# Patient Record
Sex: FEMALE | Race: WHITE | Employment: PART TIME | URBAN - METROPOLITAN AREA
[De-identification: names, ages, dates, MRNs, and addresses within clinical notes are randomized per-mention and may not be internally consistent; named-entity substitution may affect disease eponyms.]

---

## 2017-02-06 DIAGNOSIS — N92.0 MENORRHAGIA WITH REGULAR CYCLE: ICD-10-CM

## 2017-04-11 ENCOUNTER — EMPLOYEE WELLNESS (OUTPATIENT)
Dept: OTHER | Age: 42
End: 2017-04-11

## 2017-04-11 LAB
CHOLESTEROL/HDL RATIO: 2.4
CHOLESTEROL: 138 MG/DL
GLUCOSE BLD-MCNC: 96 MG/DL (ref 70–99)
HDLC SERPL-MCNC: 58 MG/DL
LDL CHOLESTEROL: 70 MG/DL (ref 0–130)
PATIENT FASTING?: YES
TRIGL SERPL-MCNC: 51 MG/DL
VLDLC SERPL CALC-MCNC: NORMAL MG/DL (ref 1–30)

## 2017-04-24 ENCOUNTER — OFFICE VISIT (OUTPATIENT)
Dept: OBGYN CLINIC | Age: 42
End: 2017-04-24
Payer: COMMERCIAL

## 2017-04-24 VITALS
DIASTOLIC BLOOD PRESSURE: 60 MMHG | WEIGHT: 143 LBS | BODY MASS INDEX: 22.98 KG/M2 | HEIGHT: 66 IN | SYSTOLIC BLOOD PRESSURE: 102 MMHG

## 2017-04-24 DIAGNOSIS — Z12.31 VISIT FOR SCREENING MAMMOGRAM: ICD-10-CM

## 2017-04-24 DIAGNOSIS — Z01.419 PAP SMEAR, AS PART OF ROUTINE GYNECOLOGICAL EXAMINATION: Primary | ICD-10-CM

## 2017-04-24 PROCEDURE — 99396 PREV VISIT EST AGE 40-64: CPT | Performed by: NURSE PRACTITIONER

## 2017-04-24 ASSESSMENT — ENCOUNTER SYMPTOMS
COLOR CHANGE: 0
NAUSEA: 0
BLOOD IN STOOL: 0
PHOTOPHOBIA: 0
VOMITING: 0
BACK PAIN: 0
COUGH: 0
CONSTIPATION: 0
CHEST TIGHTNESS: 0
SHORTNESS OF BREATH: 0
ABDOMINAL PAIN: 0
WHEEZING: 0
ABDOMINAL DISTENTION: 0
DIARRHEA: 0

## 2017-05-11 ENCOUNTER — TELEPHONE (OUTPATIENT)
Dept: OBGYN CLINIC | Age: 42
End: 2017-05-11

## 2017-05-23 ENCOUNTER — TELEPHONE (OUTPATIENT)
Dept: OBGYN CLINIC | Age: 42
End: 2017-05-23

## 2017-07-12 ENCOUNTER — HOSPITAL ENCOUNTER (OUTPATIENT)
Dept: WOMENS IMAGING | Age: 42
Discharge: HOME | End: 2017-07-12
Payer: COMMERCIAL

## 2017-07-12 DIAGNOSIS — Z12.31 VISIT FOR SCREENING MAMMOGRAM: ICD-10-CM

## 2017-07-12 PROCEDURE — 77063 BREAST TOMOSYNTHESIS BI: CPT

## 2017-09-11 DIAGNOSIS — N92.0 MENORRHAGIA WITH REGULAR CYCLE: ICD-10-CM

## 2017-10-18 NOTE — PROGRESS NOTES
Subjective:      Patient ID: Alma Delia De Paz is a 40 y.o. female. HPI Presents for 6 month follow up of LGSIL  Is on daily aygestin, would like to stop lets just do the first 7 days every month . Allergy : latex, Ancef, Shell fish, Cephalosporin  Med Hx:   Surgery : Colonoscopy, D&C, Laparoscopy, [place] tooth,     Review of Systems   Constitutional: Negative for chills, fatigue and fever. HENT: Negative for sore throat, tinnitus and trouble swallowing. Eyes: Negative for photophobia and visual disturbance. Respiratory: Negative for cough, shortness of breath, wheezing and stridor. Cardiovascular: Negative for chest pain and leg swelling. Gastrointestinal: Negative for abdominal distention, abdominal pain, constipation, diarrhea and nausea. Genitourinary: Negative for difficulty urinating, dyspareunia, dysuria, genital sores, hematuria, menstrual problem, pelvic pain, vaginal bleeding and vaginal discharge. Musculoskeletal: Negative for arthralgias, back pain, gait problem and joint swelling. Neurological: Negative for dizziness, tremors, seizures, syncope, facial asymmetry, speech difficulty, light-headedness and headaches. Psychiatric/Behavioral: Negative for agitation, behavioral problems, confusion, decreased concentration, dysphoric mood and hallucinations. The patient is not nervous/anxious. Objective:   Physical Exam   Constitutional: She is oriented to person, place, and time. She appears well-developed and well-nourished. Abdominal: Soft. Bowel sounds are normal. She exhibits no distension. There is no tenderness. There is no rebound. Genitourinary: Vagina normal and uterus normal. No vaginal discharge found. Musculoskeletal: Normal range of motion. She exhibits no edema or tenderness. Neurological: She is alert and oriented to person, place, and time. Skin: Skin is warm and dry. No rash noted. No erythema. Psychiatric: She has a normal mood and affect.  Her behavior is normal. Thought content normal.       Assessment:    LGSIL       Plan:    Collect pap   Treatment pending results   Try aygestin 5 mg 1st 7 days of the month   Period diary   rv 6 months which is her annual time

## 2017-10-23 ENCOUNTER — HOSPITAL ENCOUNTER (OUTPATIENT)
Age: 42
Setting detail: SPECIMEN
Discharge: HOME | End: 2017-10-23
Payer: COMMERCIAL

## 2017-10-23 ENCOUNTER — OFFICE VISIT (OUTPATIENT)
Dept: OBGYN CLINIC | Age: 42
End: 2017-10-23
Payer: COMMERCIAL

## 2017-10-23 VITALS — WEIGHT: 143 LBS | BODY MASS INDEX: 23.08 KG/M2 | DIASTOLIC BLOOD PRESSURE: 62 MMHG | SYSTOLIC BLOOD PRESSURE: 116 MMHG

## 2017-10-23 DIAGNOSIS — N92.0 MENORRHAGIA WITH REGULAR CYCLE: ICD-10-CM

## 2017-10-23 DIAGNOSIS — R87.612 LGSIL ON PAP SMEAR OF CERVIX: Primary | ICD-10-CM

## 2017-10-23 PROCEDURE — 99213 OFFICE O/P EST LOW 20 MIN: CPT | Performed by: NURSE PRACTITIONER

## 2017-10-23 ASSESSMENT — ENCOUNTER SYMPTOMS
SORE THROAT: 0
STRIDOR: 0
PHOTOPHOBIA: 0
ABDOMINAL PAIN: 0
COUGH: 0
WHEEZING: 0
DIARRHEA: 0
BACK PAIN: 0
ABDOMINAL DISTENTION: 0
TROUBLE SWALLOWING: 0
SHORTNESS OF BREATH: 0
NAUSEA: 0
CONSTIPATION: 0

## 2017-10-25 LAB
HPV SAMPLE: NORMAL
HPV SOURCE: NORMAL
HPV, GENOTYPE 16: NOT DETECTED
HPV, GENOTYPE 18: NOT DETECTED
HPV, HIGH RISK OTHER: NOT DETECTED
HPV, INTERPRETATION: NORMAL

## 2017-10-31 LAB — CYTOLOGY REPORT: NORMAL

## 2017-11-27 ENCOUNTER — TELEPHONE (OUTPATIENT)
Dept: OBGYN CLINIC | Age: 42
End: 2017-11-27

## 2017-11-27 NOTE — TELEPHONE ENCOUNTER
Patient states she has been using Aygestin for about 3 yrs to control bleeding. Did try to go off around Halloween and menorrhagia returned soaking through maxi pad in 1-1 1/2 hr all over car seat, so has decided to have tubal and endometrial ablation if can do at same time. Went back on Aygestin continually again and knows she will need to do that until surgery. Has [employer] but [facility] 2ndary and [place] [facility] [place] Is checking with [facility] to make sure as secondary they will cover. Appt for consult or ok to go ahead and schedule.

## 2017-11-28 ENCOUNTER — HOSPITAL ENCOUNTER (OUTPATIENT)
Age: 42
Discharge: HOME | End: 2017-11-28
Payer: COMMERCIAL

## 2017-11-28 LAB
T3 FREE: 3.35 PG/ML (ref 2.02–4.43)
THYROXINE, FREE: 1.08 NG/DL (ref 0.93–1.7)
TSH SERPL DL<=0.05 MIU/L-ACNC: 2.2 MIU/L (ref 0.3–5)

## 2017-11-28 PROCEDURE — 36415 COLL VENOUS BLD VENIPUNCTURE: CPT

## 2017-11-28 PROCEDURE — 84481 FREE ASSAY (FT-3): CPT

## 2017-11-28 PROCEDURE — 84443 ASSAY THYROID STIM HORMONE: CPT

## 2017-11-28 PROCEDURE — 84439 ASSAY OF FREE THYROXINE: CPT

## 2018-01-11 DIAGNOSIS — N92.0 MENORRHAGIA WITH REGULAR CYCLE: ICD-10-CM

## 2018-03-20 VITALS — WEIGHT: 139 LBS | BODY MASS INDEX: 23.13 KG/M2

## 2018-04-20 ENCOUNTER — TELEPHONE (OUTPATIENT)
Dept: OBGYN CLINIC | Age: 43
End: 2018-04-20

## 2018-04-23 ENCOUNTER — OFFICE VISIT (OUTPATIENT)
Dept: OBGYN CLINIC | Age: 43
End: 2018-04-23
Payer: COMMERCIAL

## 2018-04-23 ENCOUNTER — HOSPITAL ENCOUNTER (OUTPATIENT)
Age: 43
Setting detail: SPECIMEN
Discharge: HOME | End: 2018-04-23
Payer: COMMERCIAL

## 2018-04-23 VITALS
SYSTOLIC BLOOD PRESSURE: 112 MMHG | WEIGHT: 143 LBS | BODY MASS INDEX: 23.82 KG/M2 | HEIGHT: 65 IN | DIASTOLIC BLOOD PRESSURE: 64 MMHG

## 2018-04-23 DIAGNOSIS — Z01.419 PAPANICOLAOU SMEAR, AS PART OF ROUTINE GYNECOLOGICAL EXAMINATION: Primary | ICD-10-CM

## 2018-04-23 DIAGNOSIS — Z12.31 VISIT FOR SCREENING MAMMOGRAM: ICD-10-CM

## 2018-04-23 DIAGNOSIS — R87.612 LOW GRADE SQUAMOUS INTRAEPITHELIAL LESION (LGSIL) ON PAPANICOLAOU SMEAR OF CERVIX: ICD-10-CM

## 2018-04-23 DIAGNOSIS — N92.0 MENORRHAGIA WITH REGULAR CYCLE: ICD-10-CM

## 2018-04-23 PROCEDURE — 99396 PREV VISIT EST AGE 40-64: CPT | Performed by: NURSE PRACTITIONER

## 2018-04-23 ASSESSMENT — ENCOUNTER SYMPTOMS
NAUSEA: 0
COLOR CHANGE: 0
ABDOMINAL DISTENTION: 0
DIARRHEA: 0
COUGH: 0
SHORTNESS OF BREATH: 0
WHEEZING: 0
BACK PAIN: 0
CONSTIPATION: 0
VOMITING: 0
STRIDOR: 0
VOICE CHANGE: 0
PHOTOPHOBIA: 0
TROUBLE SWALLOWING: 0
ABDOMINAL PAIN: 0
SORE THROAT: 0

## 2018-05-11 LAB — CYTOLOGY REPORT: NORMAL

## 2018-05-15 ENCOUNTER — TELEPHONE (OUTPATIENT)
Dept: OBGYN CLINIC | Age: 43
End: 2018-05-15

## 2018-05-16 ENCOUNTER — OFFICE VISIT (OUTPATIENT)
Dept: OBGYN CLINIC | Age: 43
End: 2018-05-16

## 2018-05-16 VITALS — DIASTOLIC BLOOD PRESSURE: 62 MMHG | SYSTOLIC BLOOD PRESSURE: 120 MMHG

## 2018-05-16 DIAGNOSIS — Z98.51 S/P TUBAL LIGATION: ICD-10-CM

## 2018-05-16 DIAGNOSIS — Z98.890 S/P ENDOMETRIAL ABLATION: Primary | ICD-10-CM

## 2018-05-16 PROCEDURE — 99024 POSTOP FOLLOW-UP VISIT: CPT | Performed by: OBSTETRICS & GYNECOLOGY

## 2018-05-18 ENCOUNTER — EMPLOYEE WELLNESS (OUTPATIENT)
Dept: OTHER | Age: 43
End: 2018-05-18

## 2018-05-18 LAB
CHOLESTEROL/HDL RATIO: 2.2
CHOLESTEROL: 164 MG/DL
GLUCOSE BLD-MCNC: 103 MG/DL (ref 70–99)
HDLC SERPL-MCNC: 75 MG/DL
LDL CHOLESTEROL: 79 MG/DL (ref 0–130)
PATIENT FASTING?: YES
TRIGL SERPL-MCNC: 48 MG/DL
VLDLC SERPL CALC-MCNC: ABNORMAL MG/DL (ref 1–30)

## 2018-05-29 VITALS — WEIGHT: 143 LBS | BODY MASS INDEX: 23.8 KG/M2

## 2018-07-24 ENCOUNTER — HOSPITAL ENCOUNTER (OUTPATIENT)
Dept: WOMENS IMAGING | Age: 43
Discharge: HOME | End: 2018-07-26
Payer: COMMERCIAL

## 2018-07-24 DIAGNOSIS — Z12.31 VISIT FOR SCREENING MAMMOGRAM: ICD-10-CM

## 2018-07-24 PROCEDURE — 77067 SCR MAMMO BI INCL CAD: CPT

## 2018-10-31 ENCOUNTER — OFFICE VISIT (OUTPATIENT)
Dept: FAMILY MEDICINE CLINIC | Age: 43
End: 2018-10-31
Payer: COMMERCIAL

## 2018-10-31 ENCOUNTER — HOSPITAL ENCOUNTER (OUTPATIENT)
Age: 43
Setting detail: SPECIMEN
Discharge: HOME | End: 2018-10-31
Payer: COMMERCIAL

## 2018-10-31 VITALS
DIASTOLIC BLOOD PRESSURE: 62 MMHG | OXYGEN SATURATION: 97 % | HEIGHT: 66 IN | TEMPERATURE: 98.2 F | WEIGHT: 147 LBS | HEART RATE: 71 BPM | BODY MASS INDEX: 23.63 KG/M2 | SYSTOLIC BLOOD PRESSURE: 110 MMHG

## 2018-10-31 DIAGNOSIS — Z11.59 ENCOUNTER FOR HEPATITIS C SCREENING TEST FOR LOW RISK PATIENT: ICD-10-CM

## 2018-10-31 DIAGNOSIS — Z76.89 ENCOUNTER TO ESTABLISH CARE: Primary | ICD-10-CM

## 2018-10-31 DIAGNOSIS — Z13.29 SCREENING FOR THYROID DISORDER: ICD-10-CM

## 2018-10-31 PROBLEM — J30.1 ALLERGIC RHINITIS DUE TO POLLEN: Status: ACTIVE | Noted: 2018-10-31

## 2018-10-31 PROBLEM — G44.209 TENSION-TYPE HEADACHE: Status: ACTIVE | Noted: 2018-10-31

## 2018-10-31 LAB
HEPATITIS C ANTIBODY: NONREACTIVE
TSH SERPL DL<=0.05 MIU/L-ACNC: 2.11 MIU/L (ref 0.3–5)

## 2018-10-31 PROCEDURE — 99203 OFFICE O/P NEW LOW 30 MIN: CPT | Performed by: NURSE PRACTITIONER

## 2018-10-31 ASSESSMENT — PATIENT HEALTH QUESTIONNAIRE - PHQ9
2. FEELING DOWN, DEPRESSED OR HOPELESS: 0
1. LITTLE INTEREST OR PLEASURE IN DOING THINGS: 0
SUM OF ALL RESPONSES TO PHQ9 QUESTIONS 1 & 2: 0
SUM OF ALL RESPONSES TO PHQ QUESTIONS 1-9: 0
SUM OF ALL RESPONSES TO PHQ QUESTIONS 1-9: 0

## 2018-10-31 ASSESSMENT — ENCOUNTER SYMPTOMS
RHINORRHEA: 0
CONSTIPATION: 0
CHEST TIGHTNESS: 0
SHORTNESS OF BREATH: 0
DIARRHEA: 0
COUGH: 0
ABDOMINAL PAIN: 0
EYE DISCHARGE: 0
SINUS PRESSURE: 0
BLOOD IN STOOL: 0

## 2019-05-23 ENCOUNTER — EMPLOYEE WELLNESS (OUTPATIENT)
Dept: OTHER | Age: 44
End: 2019-05-23

## 2019-05-23 LAB
CHOLESTEROL/HDL RATIO: 2
CHOLESTEROL: 149 MG/DL
GLUCOSE BLD-MCNC: 94 MG/DL (ref 70–99)
HDLC SERPL-MCNC: 74 MG/DL
LDL CHOLESTEROL: 69 MG/DL (ref 0–130)
PATIENT FASTING?: YES
TRIGL SERPL-MCNC: 32 MG/DL
VLDLC SERPL CALC-MCNC: NORMAL MG/DL (ref 1–30)

## 2019-06-03 VITALS — BODY MASS INDEX: 23.26 KG/M2 | WEIGHT: 143 LBS

## 2019-06-11 ENCOUNTER — OFFICE VISIT (OUTPATIENT)
Dept: OBGYN CLINIC | Age: 44
End: 2019-06-11
Payer: COMMERCIAL

## 2019-06-11 VITALS — SYSTOLIC BLOOD PRESSURE: 110 MMHG | BODY MASS INDEX: 23.26 KG/M2 | WEIGHT: 143 LBS | DIASTOLIC BLOOD PRESSURE: 62 MMHG

## 2019-06-11 DIAGNOSIS — Z80.3 FAMILY HISTORY OF BREAST CANCER IN MOTHER: ICD-10-CM

## 2019-06-11 DIAGNOSIS — N63.0 BREAST MASS IN FEMALE: Primary | ICD-10-CM

## 2019-06-11 DIAGNOSIS — Z80.3 FAMILY HISTORY OF BREAST CANCER IN SISTER: ICD-10-CM

## 2019-06-11 PROCEDURE — 99213 OFFICE O/P EST LOW 20 MIN: CPT | Performed by: NURSE PRACTITIONER

## 2019-06-11 RX ORDER — DOXYCYCLINE HYCLATE 100 MG
100 TABLET ORAL 2 TIMES DAILY
Qty: 20 TABLET | Refills: 0 | Status: SHIPPED | OUTPATIENT
Start: 2019-06-11 | End: 2019-06-21

## 2019-06-11 ASSESSMENT — ENCOUNTER SYMPTOMS
WHEEZING: 0
COLOR CHANGE: 0
VOMITING: 0
SHORTNESS OF BREATH: 0
ABDOMINAL PAIN: 0
NAUSEA: 0
SWOLLEN GLANDS: 0

## 2019-06-11 NOTE — PROGRESS NOTES
[facility] [place]  [place] [address] [place] [address] [place] [id number]  Dept: [phone]  Dept Fax: [phone]     Floyd Villalpando is a 38 y.o. female who presents today for her medical conditions/complaintsas noted below. Floyd Villalpando is c/o of Breast Problem (rigth side- lump about the size of a marble-red started friday. )        HPI:     Other   This is a new (right breast mass) problem. The current episode started in the past 7 days (5 days). The problem occurs constantly. The problem has been gradually improving. Pertinent negatives include no abdominal pain, anorexia, chest pain, chills, fatigue, fever, nausea, neck pain, swollen glands, vomiting or weakness. Nothing aggravates the symptoms. Treatments tried: did try to express area it to see if would drain but didn't. The treatment provided no relief. Cholo Moser presents to office complaint of left breast mass states noted on 6/7/19 was around marble size at that point non tender no erythem or streaking, tried to express fluid from but never drained then has little scab following day somewhat worse, no has improved in size states smaller. Denies f/c, n/v, any other masses or swelling, or galactorrhea. Mother has hx of breast ca X2 postmenopausal diagnosis, Sister breast cancer premenopausal age 50. Both have hormone sensitive breast cancer.   Last mammogram was July 2018- normal.   Past Medical History:   Diagnosis Date    H/O anorexia nervosa       Past Surgical History:   Procedure Laterality Date    [facility]2011    [facility]     DILATION AND CURETTAGE OF UTERUS  2001, 9/2007, 2/2007    ENDOMETRIAL ABLATION      LAPAROSCOPY  2/28/2007    dx. lap for pelvic mass     OTHER SURGICAL HISTORY      removal of splenic cyst    TUBAL LIGATION      WISDOM TOOTH EXTRACTION         Family History   Problem Relation Age of Onset    Other Paternal Grandfather         suicide    Heart Disease Paternal Grandmother    Noe Adams Stroke Paternal Grandmother     Cancer Maternal Grandmother     Cancer Maternal Grandfather         colon    Hypertension Father     Stroke Father     Other Father         arrythmia    Breast Cancer Sister 50        Pre Menopausal     Breast Cancer Mother         Post Menopausal- twice       Social History     Tobacco Use    Smoking status: Never Smoker    Smokeless tobacco: Never Used   Substance Use Topics    Alcohol use: No      Current Outpatient Medications   Medication Sig Dispense Refill    doxycycline hyclate (VIBRA-TABS) 100 MG tablet Take 1 tablet by mouth 2 times daily for 10 days 20 tablet 0     No current facility-administered medications for this visit. Allergies   Allergen Reactions    Latex Rash    Dye  [Iodides] Hives     Iv P     Ancef [Cefazolin]     Shellfish-Derived Products Itching    Cephalosporins Rash    Nickel Rash       Health Maintenance   Topic Date Due    DTaP/Tdap/Td vaccine (1 - Tdap) 10/31/2019 (Originally 3/12/1995)    Breast cancer screen  07/24/2019    Cervical cancer screen  04/23/2023    Lipid screen  05/23/2024    Flu vaccine  Completed    HIV screen  Addressed    Pneumococcal 0-64 years Vaccine  Aged Out       Subjective:     Review of Systems   Constitutional: Negative for chills, fatigue, fever and unexpected weight change. Eyes: Negative for visual disturbance. Respiratory: Negative for shortness of breath and wheezing. Cardiovascular: Negative for chest pain and leg swelling. Gastrointestinal: Negative for abdominal pain, anorexia, nausea and vomiting. Genitourinary: Negative for vaginal discharge and vaginal pain. Musculoskeletal: Negative for neck pain and neck stiffness. Skin: Positive for wound. Negative for color change. Neurological: Negative for dizziness, weakness and light-headedness. Hematological: Negative for adenopathy. Does not bruise/bleed easily.        Objective:     Physical Exam   Constitutional: She is oriented to person, place, and time. She appears well-developed and well-nourished. No distress. HENT:   Head: Normocephalic and atraumatic. Right Ear: External ear normal.   Left Ear: External ear normal.   Nose: Nose normal.   Mouth/Throat: Oropharynx is clear and moist.   Eyes: Pupils are equal, round, and reactive to light. EOM are normal.   Neck: Normal range of motion. Neck supple. No thyromegaly present. Cardiovascular: Normal rate, regular rhythm and normal heart sounds. Exam reveals no gallop and no friction rub. No murmur heard. Pulmonary/Chest: Effort normal and breath sounds normal. She has no wheezes. Right breast exhibits mass. Right breast exhibits no inverted nipple, no nipple discharge, no skin change and no tenderness. Left breast exhibits no inverted nipple, no mass, no nipple discharge, no skin change and no tenderness. No breast swelling or bleeding. Abdominal: Soft. Bowel sounds are normal. There is no tenderness. Musculoskeletal: Normal range of motion. Lymphadenopathy:     She has no cervical adenopathy. Neurological: She is alert and oriented to person, place, and time. No cranial nerve deficit. Skin: Skin is warm and dry. No rash noted. She is not diaphoretic. Psychiatric: She has a normal mood and affect. Her behavior is normal. Judgment and thought content normal.   Nursing note and vitals reviewed. /62 (Site: Right Upper Arm, Position: Sitting, Cuff Size: Medium Adult)   Wt 143 lb (64.9 kg)   BMI 23.26 kg/m²     Assessment:          Diagnosis Orders   1. Breast mass in female  [facility] DIGITAL DIAGNOSTIC W OR WO CAD BILATERAL    US BREAST COMPLETE RIGHT    doxycycline hyclate (VIBRA-TABS) 100 MG tablet   2. Family history of breast cancer in mother  [facility] DIGITAL DIAGNOSTIC W OR WO CAD BILATERAL    US BREAST COMPLETE RIGHT   3.  Family history of breast cancer in sister  [facility] DIGITAL DIAGNOSTIC W OR WO CAD BILATERAL    US BREAST COMPLETE RIGHT       Plan: Breast mass and family hx of breast cancer- check diagnostic mammogram and right breast us. Will additionally place on doxycycline and encouraged warm compresses, possible skin infection. Monitor for increased size, redness, streaking, f/c, n/v or any other masses and notify us immediately if occurs. Return in about 2 weeks (around 6/25/2019) for annual exam recheck. Orders Placed This Encounter   Procedures    [facility] DIGITAL DIAGNOSTIC W OR WO CAD BILATERAL     Standing Status:   Future     Standing Expiration Date:   8/11/2020     Order Specific Question:   Reason for exam:     Answer:   breast mass family hx breast ca in first degree relative X2    US BREAST COMPLETE RIGHT     Standing Status:   Future     Standing Expiration Date:   6/11/2020     Order Specific Question:   Reason for exam:     Answer:   breast mass family hx breast ca in first degree relative X2     Orders Placed This Encounter   Medications    doxycycline hyclate (VIBRA-TABS) 100 MG tablet     Sig: Take 1 tablet by mouth 2 times daily for 10 days     Dispense:  20 tablet     Refill:  0       Patient given educational materials - seepatient instructions. Discussed use, benefit, and side effects of prescribed medications. All patient questions answered. Pt voiced understanding. Reviewed health maintenance. Instructed to continue current medications, diet and exercise. Patient agreedwith treatment plan. Follow up as directed. Electronically signed by SEB Walker CNP on 6/11/2019at 9:49 AM      Of the 15 minute duration appointment visit, Jimenez Guzman CNP spent at least 50% of the face-to-face time in counseling, explanation of diagnosis, planning of further management, and answering all questions.

## 2019-06-11 NOTE — PATIENT INSTRUCTIONS
Patient Education        Breast Pain: Care Instructions  Your Care Instructions    Breast tenderness and pain may come and go with your monthly periods (cyclic), or it may not follow any pattern (noncyclic). Breast pain is rarely caused by a serious health problem. You may need tests to find the cause. Follow-up care is a key part of your treatment and safety. Be sure to make and go to all appointments, and call your doctor if you are having problems. It's also a good idea to know your test results and keep a list of the medicines you take. How can you care for yourself at home? · If your doctor gave you medicine, take it exactly as prescribed. Call your doctor if you think you are having a problem with your medicine. · Take an over-the-counter pain medicine, such as acetaminophen (Tylenol), ibuprofen (Advil, Motrin), or naproxen (Aleve), to relieve pain and swelling. Read and follow all instructions on the label. · Do not take two or more pain medicines at the same time unless the doctor told you to. Many pain medicines have acetaminophen, which is Tylenol. Too much acetaminophen (Tylenol) can be harmful. · Wear a supportive bra, such as a sports bra or a jog bra. · Cut down on the amount of fat in your diet. If you need help planning healthy meals, see a dietitian. · Get at least 30 minutes of exercise on most days of the week. Walking is a good choice. You also may want to do other activities, such as running, swimming, cycling, or playing tennis or team sports. · Keep a healthy sleep pattern. Go to bed at the same time every night, and get up at the same time every day. When should you call for help? Call your doctor now or seek immediate medical care if:    · You have new changes in a breast, such as:  ? A lump or thickening in your breast or armpit. ? A change in the breast's size or shape. ? Skin changes, such as dimples or puckers. ? Nipple discharge.   ? A change in the color or feel of the skin of your breast or the darker area around the nipple (areola). ? A change in the shape of the nipple (it may look like it's being pulled into the breast).     · You have symptoms of a breast infection, such as:  ? Increased pain, swelling, redness, or warmth around a breast.  ? Red streaks extending from the breast.  ? Pus draining from a breast.  ? A fever.    Watch closely for changes in your health, and be sure to contact your doctor if:    · Your breast pain does not get better after 1 week.     · You have a lump or thickening in your breast or armpit. Where can you learn more? Go to https://[employer]. org and sign in to your [employer] account. Enter V415 in the [facility] box to learn more about \"Breast Pain: Care Instructions. \"     If you do not have an account, please click on the \"Sign Up Now\" link. Current as of: September 23, 2018  Content Version: 12.0  © 1513-4039 [employer]. Care instructions adapted under license by [facility] ([facility]). If you have questions about a medical condition or this instruction, always ask your healthcare professional. [place] [address] any warranty or liability for your use of this information. Bill As A Line Item Or As Units: Line Item

## 2019-06-17 ENCOUNTER — HOSPITAL ENCOUNTER (OUTPATIENT)
Dept: WOMENS IMAGING | Age: 44
Discharge: HOME | End: 2019-06-19
Payer: COMMERCIAL

## 2019-06-17 DIAGNOSIS — Z80.3 FAMILY HISTORY OF BREAST CANCER IN SISTER: ICD-10-CM

## 2019-06-17 DIAGNOSIS — N63.0 BREAST MASS IN FEMALE: ICD-10-CM

## 2019-06-17 DIAGNOSIS — Z80.3 FAMILY HISTORY OF BREAST CANCER IN MOTHER: ICD-10-CM

## 2019-06-17 PROCEDURE — 76642 ULTRASOUND BREAST LIMITED: CPT

## 2019-06-17 PROCEDURE — G0279 TOMOSYNTHESIS, MAMMO: HCPCS

## 2019-06-18 ENCOUNTER — TELEPHONE (OUTPATIENT)
Dept: OBGYN CLINIC | Age: 44
End: 2019-06-18

## 2019-06-18 NOTE — TELEPHONE ENCOUNTER
----- Message from [facility] SEB RUSSO - CNP sent at 6/17/2019  1:52 PM EDT -----  Please notify pt that mammogram was negative evidence of malignancy- she does appear to have cyst to right breast she would need to return to office for follow up breast exam if area does not resolve.

## 2019-06-25 ENCOUNTER — HOSPITAL ENCOUNTER (OUTPATIENT)
Age: 44
Setting detail: SPECIMEN
Discharge: HOME | End: 2019-06-25
Payer: COMMERCIAL

## 2019-06-25 ENCOUNTER — OFFICE VISIT (OUTPATIENT)
Dept: OBGYN CLINIC | Age: 44
End: 2019-06-25
Payer: COMMERCIAL

## 2019-06-25 VITALS
HEIGHT: 66 IN | DIASTOLIC BLOOD PRESSURE: 60 MMHG | WEIGHT: 143 LBS | BODY MASS INDEX: 22.98 KG/M2 | SYSTOLIC BLOOD PRESSURE: 112 MMHG

## 2019-06-25 DIAGNOSIS — Z01.419 ENCOUNTER FOR WELL WOMAN EXAM WITH ROUTINE GYNECOLOGICAL EXAM: Primary | ICD-10-CM

## 2019-06-25 PROCEDURE — 99396 PREV VISIT EST AGE 40-64: CPT | Performed by: NURSE PRACTITIONER

## 2019-06-25 ASSESSMENT — ENCOUNTER SYMPTOMS
VOMITING: 0
ABDOMINAL PAIN: 0
SHORTNESS OF BREATH: 0
RHINORRHEA: 0
COUGH: 0
COLOR CHANGE: 0
DIARRHEA: 0
CONSTIPATION: 0
BACK PAIN: 0
NAUSEA: 0

## 2019-06-25 NOTE — PATIENT INSTRUCTIONS
or more hormone pills, take one as soon as you remember you forgot them. Then read the pill label or call your doctor about instructions on how to take your missed pills. Use a backup method of birth control or don't have intercourse for 7 days. Pregnancy is more likely if you miss more than 1 pill. · If you had intercourse, you can use emergency contraception, such as the morning-after pill (Plan B). You can use emergency contraception for up to 5 days after having had intercourse, but it works best if you take it right away. What else do you need to know? · The pill has side effects. ? You may have very light or skipped periods. ? You may have bleeding between periods (spotting). This usually decreases after 3 to 4 months. ? You may have mood changes, less interest in sex, or weight gain. · The pill may reduce acne, heavy bleeding and cramping, and symptoms of premenstrual syndrome. · Check with your doctor before you use any other medicines, including over-the-counter medicines, vitamins, herbal products, and supplements. Birth control hormones may not work as well to prevent pregnancy when combined with other medicines. · The pill doesn't protect against sexually transmitted infection (STIs), such as herpes or HIV/AIDS. If you're not sure whether your sex partner might have an STI, use a condom to protect against disease. When should you call for help? Call your doctor now or seek immediate medical care if:    · You have severe belly pain.     · You have signs of a blood clot, such as:  ? Pain in your calf, back of the knee, thigh, or groin. ?  Redness and swelling in your leg or groin.     · You have blurred vision or other problems seeing.     · You have a severe headache.     · You have severe trouble breathing.    Watch closely for changes in your health, and be sure to contact your doctor if:    · You think you might be pregnant.     · You think you may be depressed.     · You think you may have been exposed to or have a sexually transmitted infection. Where can you learn more? Go to https://chpepiceweb.[facility]. org and sign in to your [employer] account. Enter D921 in the [facility] box to learn more about \"Combination Birth Control Pills: Care Instructions. \"     If you do not have an account, please click on the \"Sign Up Now\" link. Current as of: September 5, 2018  Content Version: 12.0  © 2214-5891 [employer]. Care instructions adapted under license by [facility] ([facility]). If you have questions about a medical condition or this instruction, always ask your healthcare professional. [address] any warranty or liability for your use of this information. Pap Test: Care Instructions  Your Care Instructions    The Pap test (also called a Pap smear) is a screening test for cancer of the cervix, which is the lower part of the uterus that opens into the vagina. The test can help your doctor find early changes in the cells that could lead to cancer. The sample of cells taken during your test has been sent to a lab so that an expert can look at the cells. It usually takes a week or two to get the results back. Follow-up care is a key part of your treatment and safety. Be sure to make and go to all appointments, and call your doctor if you are having problems. It's also a good idea to know your test results and keep a list of the medicines you take. What do the results mean? · A normal result means that the test did not find any abnormal cells in the sample. · An abnormal result can mean many things. Most of these are not cancer. The results of your test may be abnormal because:  ? You have an infection of the vagina or cervix, such as a yeast infection. ? You have an IUD (intrauterine device for birth control). ? You have low estrogen levels after menopause that are causing the cells to change. ?  You have cell changes that may be a sign of

## 2019-06-25 NOTE — PROGRESS NOTES
Dania Yu is a 38 y.o.  here for her annual exam.  The patient was seen and examined. The patients past medical, surgical, social and family history were reviewed. Current medications and allergies were reviewed, and documented in the chart. She is  3 children- boys- 12, 15, 8. 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 Tobacco abuse No    Last PAP: 18- ASCUS, -hpv, hx LGSIl  Family hx uterine or ovarian cancer-denies  Last mammogram if indicated- diagnostic on 19 and us right breast- subdermal skin lesion on right breast most consistent sebaceous cyst- no evidence malignancy recommend annual mammogram and monitor- she did take doxycycline, she states has gotten better in size, no tenderness no new masses noted no galactorrhea. ,Family hx of breast cancer -mother X2 postmenopausal, sister at age 50 both hormone sensitive breast CA. Last Dexa scan if indicated-   family hx colon cancer - maternal GF- will check on age. She had colonoscopy around 8 years ago and states was normal.           Sexually active: yes - with , multiple partners: No, Dyspareunia: No, Vaginal discharge: no,  UTI symptoms: no, voiding difficulties: no, bowels regular:Yes bloating:no      Menstrual history:   Had ablation hscope polypectomy HTA TL May 2018- benign pathology. Hx of heavy menses. She states had light periods after ablation for around 5- 6 months and now has rare intermittent spotting, no cramping bloating. Has had left ovary removed.      OB History    Para Term  AB Living   7 4 3 1 3 3   SAB TAB Ectopic Molar Multiple Live Births   3         3      # Outcome Date GA Lbr Chaz/2nd Weight Sex Delivery Anes PTL Lv   7 Term 09    M Vag-Spont  N NATALIA   6 SAB 2007           5 SAB 2007           4 Term 05    M Vag-Spont  N NATALIA   3  04 [id number]   M Vag-Spont   FD      Complications: Normal stillborn   2 Term 02    M Vag-Spont  N NATALIA   1 SAB 01 Subjective:     Review of Systems   Constitutional: Negative for chills, fatigue, fever and unexpected weight change. HENT: Negative for congestion and rhinorrhea. Eyes: Negative for visual disturbance. Respiratory: Negative for cough and shortness of breath. Cardiovascular: Negative for chest pain, palpitations and leg swelling. Gastrointestinal: Negative for abdominal pain, constipation, diarrhea, nausea and vomiting. Endocrine: Negative for cold intolerance, heat intolerance, polydipsia and polyuria. Genitourinary: Negative for dyspareunia, dysuria, flank pain, menstrual problem, pelvic pain, vaginal bleeding, vaginal discharge and vaginal pain. Musculoskeletal: Negative for back pain and myalgias. Skin: Negative for color change and rash. Neurological: Negative for dizziness, light-headedness and headaches. Hematological: Negative for adenopathy. Does not bruise/bleed easily. Psychiatric/Behavioral: Negative for self-injury and suicidal ideas. Objective:     Physical Exam   Constitutional: She is oriented to person, place, and time. She appears well-developed and well-nourished. No distress. HENT:   Head: Normocephalic and atraumatic. Right Ear: External ear normal.   Left Ear: External ear normal.   Nose: Nose normal.   Mouth/Throat: Oropharynx is clear and moist.   Eyes: Pupils are equal, round, and reactive to light. EOM are normal.   Neck: Normal range of motion. Neck supple. No thyromegaly present. Cardiovascular: Normal rate, regular rhythm and normal heart sounds. Exam reveals no gallop and no friction rub. No murmur heard. No bilateral calf tenderness or swelling   Pulmonary/Chest: Effort normal and breath sounds normal. No respiratory distress. She has no wheezes. Abdominal: Soft. Bowel sounds are normal. There is no tenderness.    Genitourinary:   Genitourinary Comments: Breasts nipples everted,  tenderness,  Right breast- mobile mass nontender no erythema does BSE  Vulva-no lesions  Vagina-pink rugated  Cervix-firm, 2 cm. Nontender, freely movable, no lesions  Uterus-ant. Smooth, firm, nontender, freely movable  Adnexa-no masses or tenderness    Musculoskeletal: Normal range of motion. Lymphadenopathy:     She has no cervical adenopathy. She has no axillary adenopathy. Right: No inguinal adenopathy present. Left: No inguinal adenopathy present. Neurological: She is alert and oriented to person, place, and time. She has normal reflexes. No cranial nerve deficit. Skin: Skin is warm and dry. No rash noted. She is not diaphoretic. Psychiatric: She has a normal mood and affect. Her behavior is normal. Judgment and thought content normal.   Nursing note and vitals reviewed. /60 (Site: Right Upper Arm, Position: Sitting, Cuff Size: Medium Adult)   Ht 5' 5.75\" (1.67 m)   Wt 143 lb (64.9 kg)   BMI 23.26 kg/m²     Assessment:       Diagnosis Orders   1. Encounter for well woman exam with routine gynecological exam  PAP SMEAR       Breast exam completed. Pelvic exam pap smear collected and sent. Cultures sent No    Plan:   Collect pap   BSE reviewed, Mammogram annually sooner if needed- will monitor mass improving in size non tender let us know any changes. Cultures declined   [facility] TL  DVT signs reviewed with patient. Refill medication if appropriate  Diet & Exercise reviewed with pt. Will discuss colonoscopy in next 2 years. Preventive  Health through PCP   RV prn/annual           Orders Placed This Encounter   Procedures    PAP SMEAR     Patient History:    No LMP recorded. Patient has had an ablation.   OBGYN Status: Ablation  Past Surgical History:  3/2011: COLONOSCOPY      Comment:  kartik   2001, 9/2007, 2/2007: DILATION AND CURETTAGE OF UTERUS  No date: ENDOMETRIAL ABLATION  2/28/2007: LAPAROSCOPY      Comment:  dx. lap for pelvic mass   No date: OTHER SURGICAL HISTORY      Comment:  removal of splenic cyst  No date: TUBAL LIGATION  No date: WISDOM TOOTH EXTRACTION      Social History    Tobacco Use      Smoking status: Never Smoker      Smokeless tobacco: Never Used       Standing Status:   Future     Standing Expiration Date:   6/25/2020     Order Specific Question:   Collection Type     Answer: Thin Prep     Order Specific Question:   Prior Abnormal Pap Test     Answer:   No     Order Specific Question:   Screening or Diagnostic     Answer:   Screening     Order Specific Question:   HPV Requested? Answer:   Yes     Order Specific Question:   High Risk Patient     Answer:   N/A         Patient given educational materials - seepatient instructions. Discussed use, benefit, and side effects of prescribed medications. All patient questions answered. Pt voiced understanding. Reviewed health maintenance. Instructed to continue current medications, diet and exercise. Patient agreedwith treatment plan. Follow up as directed.       Electronically signed by SEB Godoy CNP on 6/25/2019at 10:19 AM

## 2019-06-27 LAB
HPV SAMPLE: NORMAL
HPV, GENOTYPE 16: NOT DETECTED
HPV, GENOTYPE 18: NOT DETECTED
HPV, HIGH RISK OTHER: NOT DETECTED
HPV, INTERPRETATION: NORMAL
SPECIMEN DESCRIPTION: NORMAL

## 2019-07-01 LAB — CYTOLOGY REPORT: NORMAL

## 2019-09-11 ENCOUNTER — OFFICE VISIT (OUTPATIENT)
Dept: FAMILY MEDICINE CLINIC | Age: 44
End: 2019-09-11
Payer: COMMERCIAL

## 2019-09-11 ENCOUNTER — HOSPITAL ENCOUNTER (OUTPATIENT)
Age: 44
Setting detail: SPECIMEN
Discharge: HOME | End: 2019-09-11
Payer: COMMERCIAL

## 2019-09-11 VITALS
WEIGHT: 148 LBS | SYSTOLIC BLOOD PRESSURE: 100 MMHG | OXYGEN SATURATION: 100 % | HEIGHT: 66 IN | BODY MASS INDEX: 23.78 KG/M2 | TEMPERATURE: 98 F | DIASTOLIC BLOOD PRESSURE: 60 MMHG | HEART RATE: 69 BPM

## 2019-09-11 DIAGNOSIS — R00.2 HEART PALPITATIONS: ICD-10-CM

## 2019-09-11 DIAGNOSIS — I49.9 IRREGULAR HEART BEAT: Primary | ICD-10-CM

## 2019-09-11 DIAGNOSIS — I49.9 IRREGULAR HEART BEAT: ICD-10-CM

## 2019-09-11 LAB
ABSOLUTE EOS #: 0.07 K/UL (ref 0–0.44)
ABSOLUTE IMMATURE GRANULOCYTE: <0.03 K/UL (ref 0–0.3)
ABSOLUTE LYMPH #: 1.07 K/UL (ref 1.1–3.7)
ABSOLUTE MONO #: 0.41 K/UL (ref 0.1–1.2)
ALBUMIN SERPL-MCNC: 4.1 G/DL (ref 3.5–5.2)
ALBUMIN/GLOBULIN RATIO: 1.5 (ref 1–2.5)
ALP BLD-CCNC: 67 U/L (ref 35–104)
ALT SERPL-CCNC: 35 U/L (ref 5–33)
ANION GAP SERPL CALCULATED.3IONS-SCNC: 8 MMOL/L (ref 9–17)
AST SERPL-CCNC: 29 U/L
BASOPHILS # BLD: 1 % (ref 0–2)
BASOPHILS ABSOLUTE: <0.03 K/UL (ref 0–0.2)
BILIRUB SERPL-MCNC: 1.12 MG/DL (ref 0.3–1.2)
BUN BLDV-MCNC: 13 MG/DL (ref 6–20)
BUN/CREAT BLD: ABNORMAL (ref 9–20)
CALCIUM SERPL-MCNC: 9.3 MG/DL (ref 8.6–10.4)
CHLORIDE BLD-SCNC: 104 MMOL/L (ref 98–107)
CO2: 29 MMOL/L (ref 20–31)
CREAT SERPL-MCNC: 0.71 MG/DL (ref 0.5–0.9)
DIFFERENTIAL TYPE: ABNORMAL
EOSINOPHILS RELATIVE PERCENT: 2 % (ref 1–4)
GFR AFRICAN AMERICAN: >60 ML/MIN
GFR NON-AFRICAN AMERICAN: >60 ML/MIN
GFR SERPL CREATININE-BSD FRML MDRD: ABNORMAL ML/MIN/{1.73_M2}
GFR SERPL CREATININE-BSD FRML MDRD: ABNORMAL ML/MIN/{1.73_M2}
GLUCOSE BLD-MCNC: 94 MG/DL (ref 70–99)
HCT VFR BLD CALC: 41.7 % (ref 36.3–47.1)
HEMOGLOBIN: 13.3 G/DL (ref 11.9–15.1)
IMMATURE GRANULOCYTES: 1 %
LYMPHOCYTES # BLD: 30 % (ref 24–43)
MCH RBC QN AUTO: 29.3 PG (ref 25.2–33.5)
MCHC RBC AUTO-ENTMCNC: 31.9 G/DL (ref 28.4–34.8)
MCV RBC AUTO: 91.9 FL (ref 82.6–102.9)
MONOCYTES # BLD: 11 % (ref 3–12)
NRBC AUTOMATED: 0 PER 100 WBC
PDW BLD-RTO: 11.3 % (ref 11.8–14.4)
PLATELET # BLD: 166 K/UL (ref 138–453)
PLATELET ESTIMATE: ABNORMAL
PMV BLD AUTO: 10.6 FL (ref 8.1–13.5)
POTASSIUM SERPL-SCNC: 5.1 MMOL/L (ref 3.7–5.3)
RBC # BLD: 4.54 M/UL (ref 3.95–5.11)
RBC # BLD: ABNORMAL 10*6/UL
SEG NEUTROPHILS: 55 % (ref 36–65)
SEGMENTED NEUTROPHILS ABSOLUTE COUNT: 2.02 K/UL (ref 1.5–8.1)
SODIUM BLD-SCNC: 141 MMOL/L (ref 135–144)
TOTAL PROTEIN: 6.8 G/DL (ref 6.4–8.3)
TSH SERPL DL<=0.05 MIU/L-ACNC: 1.55 MIU/L (ref 0.3–5)
WBC # BLD: 3.6 K/UL (ref 3.5–11.3)
WBC # BLD: ABNORMAL 10*3/UL

## 2019-09-11 PROCEDURE — 99214 OFFICE O/P EST MOD 30 MIN: CPT | Performed by: INTERNAL MEDICINE

## 2019-09-11 PROCEDURE — 93000 ELECTROCARDIOGRAM COMPLETE: CPT | Performed by: INTERNAL MEDICINE

## 2019-09-11 NOTE — PROGRESS NOTES
[facility]  [facility] [address]  Dept: [phone]  Dept Fax: [phone]      Torres Mcintyre is a 38 y.o. female who presents today for hermedical conditions/complaints as noted below. Torres Mcintyre is c/o of Palpitations            HPI:     Has occasional episodes when she has a cough and a fluttery feeling in her chest, usually relieved when she coughs. Has occurred about once a year or so. No chest pain, dyspnea, dizziness, palpitations. Yesterday she had an episode and during this felt like her heart was skipping beats. No runny nose, congestion. Dry cough. No recent illness, fevers, chills, nausea, vomiting, diarrhea, dizziness.        No results found for: LABA1C          ( goal A1Cis < 7)   No results found for: LABMICR  LDL Cholesterol (mg/dL)   Date Value   05/23/2019 69   05/18/2018 79   04/11/2017 70       (goal LDL is <100)   No results found for: AST, ALT, BUN  BP Readings from Last 3 Encounters:   09/11/19 100/60   06/25/19 112/60   06/11/19 110/62          (goal 120/80)    Past Medical History:   Diagnosis Date    H/O anorexia nervosa       Past Surgical History:   Procedure Laterality Date    [facility]     DILATION AND CURETTAGE OF UTERUS  2001, 9/2007, 2/2007    ENDOMETRIAL ABLATION      LAPAROSCOPY  2/28/2007    dx. lap for pelvic mass     OTHER SURGICAL HISTORY      removal of splenic cyst    TUBAL LIGATION      WISDOM TOOTH EXTRACTION         Family History   Problem Relation Age of Onset    Other Paternal Grandfather         suicide    Heart Disease Paternal Grandmother     Stroke Paternal Grandmother     Cancer Maternal Grandmother     Cancer Maternal Grandfather         colon    Hypertension Father     Stroke Father     Other Father         arrythmia    Breast Cancer Sister 50        Pre Menopausal     Breast Cancer Mother         Post Menopausal- twice       Social History     Tobacco Use    Smoking status: Never Smoker    Smokeless tobacco: Never Used   Substance Use Topics    Alcohol use: No      No current outpatient medications on file. No current facility-administered medications for this visit. Allergies   Allergen Reactions    Latex Rash    Dye  [Iodides] Hives     Iv P     Ancef [Cefazolin]     Shellfish-Derived Products Itching    Cephalosporins Rash    Nickel Rash       Health Maintenance   Topic Date Due    Flu vaccine (1) 09/01/2019    DTaP/Tdap/Td vaccine (1 - Tdap) 10/31/2019 (Originally 3/12/1995)    Breast cancer screen  06/17/2020    Lipid screen  05/23/2024    Cervical cancer screen  06/25/2024    HIV screen  Addressed    Pneumococcal 0-64 years Vaccine  Aged Out          Review of Systems   All other systems reviewed and are negative. Objective:     /60 (Site: Left Upper Arm, Position: Sitting, Cuff Size: Medium Adult)   Pulse 69   Temp 98 °F (36.7 °C) (Oral)   Ht 5' 5.75\" (1.67 m)   Wt 148 lb (67.1 kg)   SpO2 100%   BMI 24.07 kg/m²   Physical Exam   Constitutional: She appears well-developed and well-nourished. Cardiovascular: Normal rate, regular rhythm and normal heart sounds. Exam reveals no gallop and no friction rub. No murmur heard. Pulmonary/Chest: Effort normal and breath sounds normal. No respiratory distress. She has no wheezes. Abdominal: Soft. Bowel sounds are normal. She exhibits no mass. There is no tenderness. There is no guarding. Musculoskeletal: Normal range of motion. Neurological: She is alert. Nursing note and vitals reviewed. Assessment/Plan:     1. Irregular heart beat  - EKG 12 Lead; Future  - CBC With Auto Differential; Future  - Comprehensive Metabolic Panel; Future    2. Heart palpitations  - CBC With Auto Differential; Future  - Comprehensive Metabolic Panel; Future  - TSH With Reflex Ft4; Future        No follow-ups on file.           Patient given educational materials- see patient

## 2019-09-13 ENCOUNTER — PATIENT MESSAGE (OUTPATIENT)
Dept: FAMILY MEDICINE CLINIC | Age: 44
End: 2019-09-13

## 2019-09-13 DIAGNOSIS — R00.2 PALPITATION: ICD-10-CM

## 2019-09-13 DIAGNOSIS — R00.0 TACHYCARDIA: Primary | ICD-10-CM

## 2019-09-19 ENCOUNTER — TELEPHONE (OUTPATIENT)
Dept: FAMILY MEDICINE CLINIC | Age: 44
End: 2019-09-19

## 2019-10-18 ENCOUNTER — TELEPHONE (OUTPATIENT)
Dept: FAMILY MEDICINE CLINIC | Age: 44
End: 2019-10-18

## 2019-10-21 ENCOUNTER — HOSPITAL ENCOUNTER (OUTPATIENT)
Dept: NON INVASIVE DIAGNOSTICS | Age: 44
Discharge: HOME | End: 2019-10-21
Payer: COMMERCIAL

## 2019-10-21 DIAGNOSIS — R00.0 TACHYCARDIA: ICD-10-CM

## 2019-10-21 DIAGNOSIS — R00.2 PALPITATION: ICD-10-CM

## 2019-10-21 PROCEDURE — 93225 XTRNL ECG REC<48 HRS REC: CPT

## 2019-10-21 PROCEDURE — 93226 XTRNL ECG REC<48 HR SCAN A/R: CPT

## 2019-10-26 LAB
ACQUISITION DURATION: NORMAL S
AVERAGE HEART RATE: 71 BPM
HOOKUP DATE: NORMAL
HOOKUP TIME: NORMAL
MAX HEART RATE TIME/DATE: NORMAL
MAX HEART RATE: 124 BPM
MIN HEART RATE TIME/DATE: NORMAL
MIN HEART RATE: 49 BPM
NUMBER OF QRS COMPLEXES: NORMAL
NUMBER OF SUPRAVENTRICULAR BEATS IN RUNS: 0
NUMBER OF SUPRAVENTRICULAR COUPLETS: 0
NUMBER OF SUPRAVENTRICULAR ECTOPICS: 1
NUMBER OF SUPRAVENTRICULAR ISOLATED BEATS: 1
NUMBER OF SUPRAVENTRICULAR RUNS: 0
NUMBER OF VENTRICULAR BEATS IN RUNS: 0
NUMBER OF VENTRICULAR BIGEMINAL CYCLES: 0
NUMBER OF VENTRICULAR COUPLETS: 0
NUMBER OF VENTRICULAR ECTOPICS: 0
NUMBER OF VENTRICULAR ISOLATED BEATS: 0
NUMBER OF VENTRICULAR RUNS: 0

## 2019-10-30 ENCOUNTER — OFFICE VISIT (OUTPATIENT)
Dept: FAMILY MEDICINE CLINIC | Age: 44
End: 2019-10-30
Payer: COMMERCIAL

## 2019-10-30 VITALS
HEIGHT: 66 IN | OXYGEN SATURATION: 97 % | SYSTOLIC BLOOD PRESSURE: 115 MMHG | DIASTOLIC BLOOD PRESSURE: 78 MMHG | WEIGHT: 148.6 LBS | HEART RATE: 68 BPM | BODY MASS INDEX: 23.88 KG/M2

## 2019-10-30 DIAGNOSIS — R00.2 INTERMITTENT PALPITATIONS: Primary | ICD-10-CM

## 2019-10-30 PROCEDURE — 99213 OFFICE O/P EST LOW 20 MIN: CPT | Performed by: INTERNAL MEDICINE

## 2019-10-30 ASSESSMENT — PATIENT HEALTH QUESTIONNAIRE - PHQ9
SUM OF ALL RESPONSES TO PHQ QUESTIONS 1-9: 0
2. FEELING DOWN, DEPRESSED OR HOPELESS: 0
1. LITTLE INTEREST OR PLEASURE IN DOING THINGS: 0
SUM OF ALL RESPONSES TO PHQ QUESTIONS 1-9: 0
SUM OF ALL RESPONSES TO PHQ9 QUESTIONS 1 & 2: 0

## 2020-04-30 ENCOUNTER — HOSPITAL ENCOUNTER (OUTPATIENT)
Age: 45
Discharge: HOME | End: 2020-04-30
Payer: COMMERCIAL

## 2020-04-30 PROCEDURE — U0003 INFECTIOUS AGENT DETECTION BY NUCLEIC ACID (DNA OR RNA); SEVERE ACUTE RESPIRATORY SYNDROME CORONAVIRUS 2 (SARS-COV-2) (CORONAVIRUS DISEASE [COVID-19]), AMPLIFIED PROBE TECHNIQUE, MAKING USE OF HIGH THROUGHPUT TECHNOLOGIES AS DESCRIBED BY CMS-2020-01-R: HCPCS

## 2020-05-01 ENCOUNTER — HOSPITAL ENCOUNTER (OUTPATIENT)
Age: 45
Discharge: HOME | End: 2020-05-01
Payer: COMMERCIAL

## 2020-05-01 PROCEDURE — U0003 INFECTIOUS AGENT DETECTION BY NUCLEIC ACID (DNA OR RNA); SEVERE ACUTE RESPIRATORY SYNDROME CORONAVIRUS 2 (SARS-COV-2) (CORONAVIRUS DISEASE [COVID-19]), AMPLIFIED PROBE TECHNIQUE, MAKING USE OF HIGH THROUGHPUT TECHNOLOGIES AS DESCRIBED BY CMS-2020-01-R: HCPCS

## 2020-05-04 LAB — SARS-COV-2, NAA: NOT DETECTED

## 2020-05-05 ENCOUNTER — TELEPHONE (OUTPATIENT)
Dept: PRIMARY CARE CLINIC | Age: 45
End: 2020-05-05

## 2020-05-11 ENCOUNTER — TELEPHONE (OUTPATIENT)
Dept: PRIMARY CARE CLINIC | Age: 45
End: 2020-05-11

## 2020-09-21 ENCOUNTER — HOSPITAL ENCOUNTER (OUTPATIENT)
Age: 45
Setting detail: SPECIMEN
Discharge: HOME | End: 2020-09-21
Payer: COMMERCIAL

## 2020-09-21 ENCOUNTER — OFFICE VISIT (OUTPATIENT)
Dept: OBGYN CLINIC | Age: 45
End: 2020-09-21
Payer: COMMERCIAL

## 2020-09-21 VITALS
SYSTOLIC BLOOD PRESSURE: 110 MMHG | WEIGHT: 148 LBS | DIASTOLIC BLOOD PRESSURE: 62 MMHG | TEMPERATURE: 98.2 F | BODY MASS INDEX: 24.07 KG/M2

## 2020-09-21 PROCEDURE — 99396 PREV VISIT EST AGE 40-64: CPT | Performed by: NURSE PRACTITIONER

## 2020-09-21 ASSESSMENT — ENCOUNTER SYMPTOMS
VOMITING: 0
ABDOMINAL PAIN: 0
DIARRHEA: 0
COLOR CHANGE: 0
CONSTIPATION: 0
COUGH: 0
BACK PAIN: 0
SHORTNESS OF BREATH: 0
RHINORRHEA: 0
NAUSEA: 0

## 2020-09-21 NOTE — PATIENT INSTRUCTIONS
Patient Education      Patient Education        Learning About Breast Cancer Screening  What is breast cancer screening? Breast cancer occurs when cells that are not normal grow in one or both of your breasts. Screening tests can help find breast cancer early. Cancer is easier to treat when it's found early. Having concerns about breast cancer is common. That's why it's important to talk with your doctor about when to start and how often to get screened for breast cancer. How is breast cancer screening done? Several screening tests can be used to check for breast cancer. Mammograms. These tests check for signs of cancer using X-rays. They can show tumors that are too small for you or your doctor to feel. During a mammogram, a machine squeezes your breasts to make them flatter and easier to X-ray. At least two pictures are taken of each breast. One is taken from the top and one from the side. 3-D mammograms. These tests are also called digital breast tomosynthesis. Your breast is positioned on a flat plate. A top plate is pressed against your breast to keep it in position. The X-ray arm then moves in an arc above the breast and takes many pictures. A computer uses these X-rays to create a three-dimensional image. Clinical breast exam.   In this exam, your doctor carefully feels your breasts and under your arms to check for lumps or other changes. When should you get screened? Talk with your doctor about when you should start being tested for breast cancer. How often you get tested and the kind of tests you get will depend on your age and your risk. The guidelines that follow are for women who have an average risk for breast cancer. If you have a higher risk, such as having a family history of breast cancer in multiple relatives or at a young age, your doctor may recommend different screening for you.   · Ages 21 to 44: Some experts recommend that women have a clinical breast exam every 1 to 3 years, starting at age 22. Ask your doctor how often you should have this test. If you have a high risk for breast cancer, talk with your doctor about the best schedule and tests for you. · Ages 36 and older: Talk with your doctor about how often you should have mammograms and clinical breast exams. What is your risk for breast cancer? If you don't already know your risk of breast cancer, you can ask your doctor about it. You can also look it up at www.cancer.gov/bcrisktool/. If your doctor says that you have a high or very high risk, ask about ways to reduce your risk. These could include getting extra screening, taking medicine, or having surgery. If you have a strong family history of breast cancer, ask your doctor about genetic testing. What steps can you take to stay healthy? Some things that increase your risk of breast cancer, such as your age and being female, cannot be controlled. But you can do some things to stay as healthy as you can. · Learn what your breasts normally look and feel like. If you notice any changes, tell your doctor. · If you drink alcohol, limit how much you drink. Any amount of alcohol may increase your risk for some types of cancer. · If you smoke, quit. When you quit smoking, you lower your chances of getting many types of cancer. You can also do your best to eat well, be active, and stay at a healthy weight. Eating healthy foods and being active every day, as well as staying at a healthy weight, may help prevent cancer. Where can you learn more? Go to https://GenoLogicsterry.AirPair. org and sign in to your Smallable account. Enter T928 in the [facility] box to learn more about \"Learning About Breast Cancer Screening. \"     If you do not have an account, please click on the \"Sign Up Now\" link. Current as of: August 22, 2019               Content Version: 12.5  © 9897-4641 Healthwise, Incorporated. Care instructions adapted under license by [facility] ([facility]).  If you have questions about a medical condition or this instruction, always ask your healthcare professional. [address] any warranty or liability for your use of this information. Pap Test: Care Instructions  Your Care Instructions     The Pap test (also called a Pap smear) is a screening test for cancer of the cervix, which is the lower part of the uterus that opens into the vagina. The test can help your doctor find early changes in the cells that could lead to cancer. The sample of cells taken during your test has been sent to a lab so that an expert can look at the cells. It usually takes a week or two to get the results back. Follow-up care is a key part of your treatment and safety. Be sure to make and go to all appointments, and call your doctor if you are having problems. It's also a good idea to know your test results and keep a list of the medicines you take. What do the results mean? · A normal result means that the test did not find any abnormal cells in the sample. · An abnormal result can mean many things. Most of these are not cancer. The results of your test may be abnormal because:  ? You have an infection of the vagina or cervix, such as a yeast infection. ? You have an IUD (intrauterine device for birth control). ? You have low estrogen levels after menopause that are causing the cells to change. ? You have cell changes that may be a sign of precancer or cancer. The results are ranked based on how serious the changes might be. There are many other reasons why you might not get a normal result. If the results were abnormal, you may need to get another test within a few weeks or months. If the results show changes that could be a sign of cancer, you may need a test called a colposcopy, which provides a more complete view of the cervix. Sometimes the lab cannot use the sample because it does not contain enough cells or was not preserved well.  If so, you may need to have the test again. This is not common, but it does happen from time to time. When should you call for help? Watch closely for changes in your health, and be sure to contact your doctor if:  · You have vaginal bleeding or pain for more than 2 days after the test. It is normal to have a small amount of bleeding for a day or two after the test.  Where can you learn more? Go to https://Protom Internationalpedebbyeweb.Definition 6. org and sign in to your AisleBuyer account. Enter Q922 in the [facility] box to learn more about \"Pap Test: Care Instructions. \"     If you do not have an account, please click on the \"Sign Up Now\" link. Current as of: August 22, 2019               Content Version: 12.5  © 2527-5295 Healthwise, Incorporated. Care instructions adapted under license by [facility] ([facility]). If you have questions about a medical condition or this instruction, always ask your healthcare professional. [place] [address] any warranty or liability for your use of this information.

## 2020-09-21 NOTE — PROGRESS NOTES
Autumn Zamora is a 41 y.o.  here for her annual exam.  The patient was seen and examined. The patients past medical, surgical, social and family history were reviewed. Current medications and allergies were reviewed, and documented in the chart. She is  3 children- boys senior anat, freshmand at [employer], and 7th grader [employer]. 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         Tobacco abuse No     Last PAP: 2019- negative hx LGSIl  Family hx uterine or ovarian cancer-denies  Last mammogram if indicated- diagnostic on 19 and us right breast- subdermal skin lesion on right breast most consistent sebaceous cyst- no evidence malignancy recommend annual mammogram and monitor- ,Family hx of breast cancer -mother X2 postmenopausal, sister at age 50 both hormone sensitive breast CA. Last Dexa scan if indicated-   family hx colon cancer - maternal GF- will check on age. She had colonoscopy around 9 years ago and states was normal.     Sexually active: yes - with , multiple partners: No, Dyspareunia: No, Vaginal discharge: no,  UTI symptoms: no, voiding difficulties: yes- notes urgency and also once she urinates she stands up and still has urinary leakage has had for years , bowels regular:Yes bloating:no      Menstrual history:  Had ablation hscope polypectomy HTA TL May 2018- benign pathology. Hx of heavy menses. She states had light periods after ablation for around 5- 6 months and now has rare intermittent spotting, no cramping bloating. Has had left ovary removed.      OB History    Para Term  AB Living   7 4 3 1 3 3   SAB TAB Ectopic Molar Multiple Live Births   3         3      # Outcome Date GA Lbr Chaz/2nd Weight Sex Delivery Anes PTL Lv   7 Term 09    M Vag-Spont  N NATALIA   6 SAB 2007           5 SAB 2007           4 Term 05    M Vag-Spont  N NATALIA   3  04 34w0d   M Vag-Spont   FD      Complications: Normal stillborn   2 Term 02 M Vag-Spont  N NATALIA   1 SAB 01/07/01               Vitals:    09/21/20 1052   BP: 110/62   Site: Right Upper Arm   Position: Sitting   Cuff Size: Medium Adult   Temp: 98.2 °F (36.8 °C)   Weight: 148 lb (67.1 kg)       Wt Readings from Last 3 Encounters:   09/21/20 148 lb (67.1 kg)   10/30/19 148 lb 9.6 oz (67.4 kg)   09/11/19 148 lb (67.1 kg)     Past Medical History:   Diagnosis Date    H/O anorexia nervosa                                                                    Past Surgical History:   Procedure Laterality Date    [facility]     DILATION AND CURETTAGE OF UTERUS  2001, 9/2007, 2/2007    ENDOMETRIAL ABLATION      LAPAROSCOPY  2/28/2007    dx. lap for pelvic mass     OTHER SURGICAL HISTORY      removal of splenic cyst    TUBAL LIGATION      WISDOM TOOTH EXTRACTION       Family History   Problem Relation Age of Onset    Other Paternal Grandfather         suicide    Heart Disease Paternal Grandmother     Stroke Paternal Grandmother     Cancer Maternal Grandmother     Cancer Maternal Grandfather         colon    Hypertension Father     Stroke Father     Other Father         arrythmia    Breast Cancer Sister 50        Pre Menopausal     Breast Cancer Mother         Post Menopausal- twice     Social History     Tobacco Use   Smoking Status Never Smoker   Smokeless Tobacco Never Used     Social History     Substance and Sexual Activity   Alcohol Use No        Social History     Tobacco History     Smoking Status  Never Smoker    Smokeless Tobacco Use  Never Used          Alcohol History     Alcohol Use Status  No          Drug Use     Drug Use Status  No          Sexual Activity     Sexually Active  Yes Partners  Male              Allergies   Allergen Reactions    Latex Rash    Dye  [Iodides] Hives     Iv P     Ancef [Cefazolin]     Shellfish-Derived Products Itching    Cephalosporins Rash    Nickel Rash     No current outpatient medications on file.      No current facility-administered medications for this visit. Subjective:     Review of Systems   Constitutional: Negative for chills, fatigue, fever and unexpected weight change. HENT: Negative for congestion and rhinorrhea. Eyes: Negative for visual disturbance. Respiratory: Negative for cough and shortness of breath. Cardiovascular: Negative for chest pain, palpitations and leg swelling. Gastrointestinal: Negative for abdominal pain, constipation, diarrhea, nausea and vomiting. Endocrine: Negative for cold intolerance, heat intolerance, polydipsia and polyuria. Genitourinary: Positive for urgency. Negative for dyspareunia, dysuria, flank pain, menstrual problem, pelvic pain, vaginal bleeding, vaginal discharge and vaginal pain. Musculoskeletal: Negative for back pain and myalgias. Skin: Negative for color change and rash. Neurological: Negative for dizziness, light-headedness and headaches. Hematological: Negative for adenopathy. Does not bruise/bleed easily. Psychiatric/Behavioral: Negative for self-injury and suicidal ideas. Objective:     Physical Exam  Vitals signs and nursing note reviewed. Constitutional:       General: She is not in acute distress. Appearance: She is well-developed. She is not diaphoretic. HENT:      Head: Normocephalic and atraumatic. Right Ear: External ear normal.      Left Ear: External ear normal.      Nose: Nose normal.   Eyes:      Pupils: Pupils are equal, round, and reactive to light. Neck:      Musculoskeletal: Normal range of motion and neck supple. Thyroid: No thyromegaly. Cardiovascular:      Rate and Rhythm: Normal rate and regular rhythm. Heart sounds: Normal heart sounds. No murmur. No friction rub. No gallop. Comments: No bilateral calf tenderness or swelling  Pulmonary:      Effort: Pulmonary effort is normal. No respiratory distress. Breath sounds: Normal breath sounds. No wheezing.    Abdominal: General: Bowel sounds are normal.      Palpations: Abdomen is soft. Tenderness: There is no abdominal tenderness. Genitourinary:     Comments: Breasts nipples everted, no masses or tenderness, does BSE  Vulva-no lesions  Vagina-pink rugated  Cervix-firm, 2 cm. Nontender, freely movable, no lesions  Uterus-ant. Smooth, firm, nontender, freely movable  Adnexa-no masses or tenderness   Musculoskeletal: Normal range of motion. Lymphadenopathy:      Cervical: No cervical adenopathy. Skin:     General: Skin is warm and dry. Findings: No rash. Neurological:      Mental Status: She is alert and oriented to person, place, and time. Cranial Nerves: No cranial nerve deficit. Deep Tendon Reflexes: Reflexes are normal and symmetric. Psychiatric:         Behavior: Behavior normal.         Thought Content: Thought content normal.         Judgment: Judgment normal.       /62 (Site: Right Upper Arm, Position: Sitting, Cuff Size: Medium Adult)   Temp 98.2 °F (36.8 °C)   Wt 148 lb (67.1 kg)   Breastfeeding No   BMI 24.07 kg/m²     Assessment:       Diagnosis Orders   1. Visit for gynecologic examination  PAP SMEAR   2. Visit for screening mammogram  OK DIGITAL SCREEN W OR WO CAD BILATERAL   3. Incontinence overflow, stress female  [facility] - [place]       Breast exam completed. Pelvic exam pap smear collected and sent. Cultures sent No    Plan:   Collect pap   BSE reviewed, Mammogram ordered  STD prevention reviewed    Refer PFT for urinary symptoms if persistent referral to urogynecology recomended  Cultures declined     Diet & Exercise reviewed with pt.     Preventive  Health through PCP   RV prn/annual           Orders Placed This Encounter   Procedures    OK DIGITAL SCREEN W OR WO CAD BILATERAL     Standing Status:   Future     Standing Expiration Date:   11/21/2021     Order Specific Question:   Reason for exam:     Answer:   SCREENING    PAP SMEAR     Patient History:    No LMP recorded. Patient has had an ablation. OBGYN Status: Ablation  Past Surgical History:  3/2011: COLONOSCOPY      Comment:  kartik   2001, 9/2007, 2/2007: DILATION AND CURETTAGE OF UTERUS  No date: ENDOMETRIAL ABLATION  2/28/2007: LAPAROSCOPY      Comment:  dx. lap for pelvic mass   No date: OTHER SURGICAL HISTORY      Comment:  removal of splenic cyst  No date: TUBAL LIGATION  No date: WISDOM TOOTH EXTRACTION      Social History    Tobacco Use      Smoking status: Never Smoker      Smokeless tobacco: Never Used       Standing Status:   Future     Standing Expiration Date:   9/22/2021     Order Specific Question:   Collection Type     Answer: Thin Prep     Order Specific Question:   Prior Abnormal Pap Test     Answer:   No     Order Specific Question:   Screening or Diagnostic     Answer:   Screening     Order Specific Question:   HPV Requested? Answer:   Yes     Order Specific Question:   High Risk Patient     Answer:   N/A   [facility] - [place]     Referral Priority:   Routine     Referral Type:   Eval and Treat     Referral Reason:   Specialty Services Required     Requested Specialty:   Physical Therapy     Number of Visits Requested:   1     No orders of the defined types were placed in this encounter. Patient given educational materials - seepatient instructions. Discussed use, benefit, and side effects of prescribed medications. All patient questions answered. Pt voiced understanding. Reviewed health maintenance. Instructed to continue current medications, diet and exercise. Patient agreedwith treatment plan. Follow up as directed.       Electronically signed by SEB Melo CNP on 9/21/2020at 5:17 PM

## 2020-10-01 LAB — CYTOLOGY REPORT: NORMAL

## 2020-10-07 ENCOUNTER — APPOINTMENT (OUTPATIENT)
Dept: PHYSICAL THERAPY | Facility: CLINIC | Age: 45
End: 2020-10-07
Payer: COMMERCIAL

## 2020-10-08 ENCOUNTER — HOSPITAL ENCOUNTER (OUTPATIENT)
Dept: PHYSICAL THERAPY | Facility: CLINIC | Age: 45
Setting detail: THERAPIES SERIES
Discharge: HOME | End: 2020-10-08
Payer: COMMERCIAL

## 2020-10-08 PROCEDURE — 97161 PT EVAL LOW COMPLEX 20 MIN: CPT

## 2020-10-08 PROCEDURE — 97110 THERAPEUTIC EXERCISES: CPT

## 2020-10-21 ENCOUNTER — HOSPITAL ENCOUNTER (OUTPATIENT)
Dept: PHYSICAL THERAPY | Facility: CLINIC | Age: 45
Setting detail: THERAPIES SERIES
Discharge: HOME | End: 2020-10-21
Payer: COMMERCIAL

## 2020-10-21 PROCEDURE — G0283 ELEC STIM OTHER THAN WOUND: HCPCS

## 2020-10-21 PROCEDURE — 90912 BFB TRAINING 1ST 15 MIN: CPT

## 2020-11-11 ENCOUNTER — HOSPITAL ENCOUNTER (OUTPATIENT)
Dept: PHYSICAL THERAPY | Facility: CLINIC | Age: 45
Setting detail: THERAPIES SERIES
Discharge: HOME | End: 2020-11-11
Payer: COMMERCIAL

## 2020-11-11 ENCOUNTER — HOSPITAL ENCOUNTER (OUTPATIENT)
Dept: WOMENS IMAGING | Age: 45
Discharge: HOME | End: 2020-11-13
Payer: COMMERCIAL

## 2020-11-11 PROCEDURE — 97110 THERAPEUTIC EXERCISES: CPT

## 2020-11-11 PROCEDURE — 90912 BFB TRAINING 1ST 15 MIN: CPT

## 2020-11-11 PROCEDURE — G0283 ELEC STIM OTHER THAN WOUND: HCPCS

## 2020-11-11 PROCEDURE — 77063 BREAST TOMOSYNTHESIS BI: CPT

## 2020-11-22 NOTE — CONSULTS
[x]?? No  Location: n/a       Pain Rating: (0-10 scale) 0/10   Pain altered Tx:  []? ? Yes  [x]? ? No  Action:     Symptoms:  []?? Improving     []? ? Worsening  [x]? ? Same  Better:  []?? AM    []? ? PM    []? ? Sit    []? ? Rise/Sit    []? ?Stand    []? ? Walk    []? ? Lying    []? ? Other:  Worse: []?? AM    []? ? PM    []? ? Sit    []? ? Rise/Sit    []? ?Stand    []? ? Walk    []? ? Lying    []? ? Bend          []? ? Valsalva    []? ? Other:  Sleep: [x]? ? OK    []? ? Disturbed     Objective:                      OBSERVATION  No Deficit  Deficit  Not Tested  Comments    External                Posture  x          Pelvic alignment  x          Muscle Spasm  x          Scarring  x          Diastasis  x        Introitus    x   Min gaping    Perineal Descent    x    Min descent    Skin Condition  x          Excursion    x   Min excursion    External Clock  x          Internal             Prolapse Test    x   Grade I cysto   Internal clock    x    hypotonic   Vaginal Vault    x   hypotonic   Muscle bulk    x   hypotonic   Muscle Power    x   2/5    Muscle Endurance    x   2  Sec w/muscle flickering   Quality of Contraction    x   Slow rise, slow release   Specificity    x   Min overflow of glutes, adductors    Coordination    x       Sensation  x          FUNCTION  Normal  Difficult  Unable        Cough/Sneeze    X         Supine-Sit    X         Squatting    X         Bending/ stooping    X         Stairs    X         Hopping     X         Jumping     X         Running     X         Lifting/carrying     X            Comments: Neg KTC, Neg SLR, Neg Rosa     Assessment:Pt noted with weak PF musculature and inability to use correct musculature for strength improvement. Will provide ex prgm, biofeedback and education as appropriate, May need e-stim if ex alone is not sufficient. Problems:   []???? ? Pain:  []???? ? ROM:  [x]???? ? Strength:  [x]???? ? Function:  []???? Other:     STG: (to be met in 6 treatments)  1.  Able to isolate PF musculature  2. ? Strength:PF 3/5  3. ? Function: No reports of leaking with cough, sneeze or laugh. 4. Patient to be independent with home exercise program as demonstrated by performance with correct form without cues. LTG: (to be met in 12 treatments)  1. PF 4/5  2. Able to perform all advanced ADL's without leaking     Patient goals: To stay dry     Rehab Potential:  [x]? ? Good  []? ? Fair  []? ? Poor              Suggested Professional Referral:  [x]? ? No  []? ? Yes:  Barriers to Goal Achievement:  [x]? ? No  []? ? Yes:  Domestic Concerns:  [x]? ? No  []? ? Yes:     Pt. Education:  [x]? ? Plans/Goals, Risks/Benefits discussed  [x]? ? Home exercise program  Method of Education: [x]? ? Verbal  [x]? ? Demo  [x]? ? Written (PF ex, knack ex, urine stop test)  Comprehension of Education:  [x]? ? Verbalizes understanding. [x]? ? Demonstrates understanding.  []?? Needs Review. []?? Demonstrates/verbalizes understanding of HEP/Ed previously given.     Treatment Plan:  [x]? ? Therapeutic Exercise   76261             []? ? Iontophoresis: 4 mg/mL Dexamethasone Sodium Phosphate  mAmin  95719   []? ? Therapeutic Activity  36382 []? ? Vasopneumatic cold with compression  30236               []? Erven Byes  01077 []?? Ultrasound        [id number]   [x]? ? Neuromuscular Re-education  [phone] [x]? ? Electrical Stimulation Unattended  [id number]   [x]? ?[address]  [address] []? ? Electrical Stimulation Attended  [id number]   [x]? ? Instruction in HEP       []? ? Lumbar/Cervical Traction  [id number]   []? Elmer Arnol  [id number] [x]? ? Cold/hotpack     []? ?Kimberly Letters   [id number]      []? ? Dry Needling, 1 or 2 muscles  16884   [x]? ?Alf Dodson, first 15 minutes   [id number]  []? ? Biofeedback, additional 15 minutes   [id number] []? ? Dry Needling, 3 or more muscles  30801      Frequency:  1 x/week for 8 visits     Todays Treatment:  Modalities:  Precautions:  Exercises:  Exercise Reps/ Time Weight/ Level Comments   Pelvic model explanation         Urine stop test         PF ex: long and quick         Th knack ex            Specific Instructions for next treatment:Biofeedback, ES, TA ex, bridges, clamshell ex     Evaluation Complexity:  History (Personal factors, comorbidities) [x]? ? 0 []?? 1-2 []?? 3+   Exam (limitations, restrictions) [x]? ? 1-2 []?? 3 []? ? 4+   Clinical presentation (progression) [x]? ? Stable []? ? Evolving  []? ? Unstable   Decision Making [x]? ? Low []? ? Moderate []? ? High     [x]? ? Low Complexity []? ? Moderate Complexity []? ? High Complexity         Treatment Charges: Mins Units   [x]? ? Evaluation       [x]? ?  Low       []? ?  Moderate       []? ?  High 20 1   []? ?  Modalities:        [x]? ?  Ther Exercise 15 1   []? ?  Manual Therapy       []? ?  Ther Activities       []? ?  Aquatics       []? ?  Vasocompression       []? ?  Other          TOTAL TREATMENT TIME: 35     Time in:1000     Time out:1040     Electronically signed Addie Dias PT     Physician Signature:________________________________Date:__________________  By signing above or cosigning this note, I have reviewed this plan of care and certify a need for medically necessary rehabilitation services.      *PLEASE SIGN ABOVE AND FAX BACK ALL PAGES*

## 2020-11-22 NOTE — FLOWSHEET NOTE
about 4-5 sec avg then noted muscular re-engagement to attempt to hold the contraction. Fair but improved resting tone. Good coordination coordination. Performed ES after biofeedback. 5:10 for 15 min. Pt contracted with machine last 10 minutes. Instructed in new ex of bridge and clam shell ex with PF contraction. HO provided. Reviewed HEP. Will have pt return in about 3-4 weeks to allow ample time to work on HEP    STG: (to be met in 6 treatments)  1. Able to isolate PF musculature  2. ? Strength:PF 3/5  3. ? Function: No reports of leaking with cough, sneeze or laugh. 4. Patient to be independent with home exercise program as demonstrated by performance with correct form without cues. LTG: (to be met in 12 treatments)  1. PF 4/5  2. Able to perform all advanced ADL's without leaking     Patient goals: To stay dry    Pt. Education:  [x] Yes  [] No  [x] Reviewed Prior HEP/Ed  Method of Education: [x] Verbal  [] Demo  [] Written  Comprehension of Education:  [x] Verbalizes understanding. [] Demonstrates understanding. [] Needs review. [] Demonstrates/verbalizes HEP/Ed previously given. Plan: [x] Continue current frequency toward long and short term goals.     [x] Specific Instructions for subsequent treatments: Biofeedback, ES,       Time In:0930           Time Out: 5732    Electronically signed by:  Lawanda Rice PT

## 2020-11-22 NOTE — FLOWSHEET NOTE
[] [facility]) - [facility]  [address]  [phone]  F: [phone] [] [address]  [facility]   [address]  P: [phone]  F: [phone] [x] [address] [facility]  Therapy  [address]  P: [phone]  F: [phone] [] [address]  P: [phone]  F: [phone] [] [address]  [facility]   [address]   [place]: [phone]  F: [phone]      Physical Therapy Daily Treatment Note    Date:  10/21/2020  Patient Name:  Cinthya Kessler    :  1975  MRN: [id number]  Physician: Mari 54: MMO(vs unlimited per [place])  Medical Diagnosis: Stress Incontinence                    Rehab Codes: M62.5, N39.3  Onset Date: 2020                                  Next 's appt: PRN    Visit# / total visits: 2/8    Cancels/No Shows:     Subjective:    Pain:  [] Yes  [x] No Location: N/A Pain Rating: (0-10 scale) 0/10  Pain altered Tx:  [x] No  [] Yes  Action:  Comments: Pt states that she has noted she has been staying dry. Only one incidence of post void dribble. Objective:  Modalities:   Precautions:  Exercises:  Exercise Reps/ Time Weight/ Level Comments   Pelvic model explanation         Urine stop test         PF ex: long and quick         Th maheshack ex              Treatment Charges: Mins Units   [x]  Modalities: ES 15 1   []  Ther Exercise     []  Manual Therapy     []  Ther Activities     []  Aquatics     []  Vasocompression     [x]  Other: BF 15 1   Total Treatment time 30 2       Assessment: [x] Progressing toward goals. [] No change. [x] Other: Hooked pt up to biofeedback with internal sensor. Good isolation of PF.   Hold time of about 3-4 sec avg then noted muscular re-engagement to attempt to hold the contraction. Fair resting tone. [address] coordination. Performed ES after biofeedback. 5:10 for 15 min. Pt contracted with machine last 10 minutes. Reviewed HEP. Will have pt return in about 3-4 weeks to allow ample time to work on HEP    STG: (to be met in 6 treatments)  1. Able to isolate PF musculature  2. ? Strength:PF 3/5  3. ? Function: No reports of leaking with cough, sneeze or laugh. 4. Patient to be independent with home exercise program as demonstrated by performance with correct form without cues. LTG: (to be met in 12 treatments)  1. PF 4/5  2. Able to perform all advanced ADL's without leaking     Patient goals: To stay dry    Pt. Education:  [x] Yes  [] No  [x] Reviewed Prior HEP/Ed  Method of Education: [x] Verbal  [] Demo  [] Written  Comprehension of Education:  [x] Verbalizes understanding. [] Demonstrates understanding. [] Needs review. [] Demonstrates/verbalizes HEP/Ed previously given. Plan: [x] Continue current frequency toward long and short term goals.     [x] Specific Instructions for subsequent treatments: Biofeedback, ES, TA ex, stacy, kirk ex      Time In:0900           Time Out: 3047    Electronically signed by:  Yahir Shirley PT

## 2020-11-23 ENCOUNTER — TELEPHONE (OUTPATIENT)
Dept: OBGYN CLINIC | Age: 45
End: 2020-11-23

## 2020-11-23 NOTE — TELEPHONE ENCOUNTER
Pt will proceed with her MRI - order placed and told her to make sure she follow up with pre cert to make sure it was covered.

## 2020-12-07 ENCOUNTER — HOSPITAL ENCOUNTER (OUTPATIENT)
Dept: PHYSICAL THERAPY | Facility: CLINIC | Age: 45
Setting detail: THERAPIES SERIES
Discharge: HOME | End: 2020-12-07
Payer: COMMERCIAL

## 2020-12-07 PROCEDURE — 90912 BFB TRAINING 1ST 15 MIN: CPT

## 2020-12-09 ENCOUNTER — APPOINTMENT (OUTPATIENT)
Dept: PHYSICAL THERAPY | Facility: CLINIC | Age: 45
End: 2020-12-09
Payer: COMMERCIAL

## 2021-03-30 DIAGNOSIS — Z80.3 FAMILY HISTORY OF BREAST CANCER IN MOTHER: ICD-10-CM

## 2021-03-30 DIAGNOSIS — R92.2 DENSE BREAST TISSUE ON MAMMOGRAM: ICD-10-CM

## 2021-03-30 DIAGNOSIS — Z91.89 AT HIGH RISK FOR BREAST CANCER: Primary | ICD-10-CM

## 2021-03-30 DIAGNOSIS — Z80.3 FAMILY HISTORY OF BREAST CANCER IN SISTER: ICD-10-CM

## 2021-04-01 NOTE — TELEPHONE ENCOUNTER
In for rocco today. Cheilitis Normal Treatment: I recommended application of Vaseline or Aquaphor numerous times a day (as often as every hour) and before going to bed.

## 2021-05-12 ENCOUNTER — HOSPITAL ENCOUNTER (OUTPATIENT)
Dept: MRI IMAGING | Age: 46
Discharge: HOME | End: 2021-05-14
Payer: COMMERCIAL

## 2021-05-12 DIAGNOSIS — R92.2 DENSE BREAST TISSUE ON MAMMOGRAM: ICD-10-CM

## 2021-05-12 DIAGNOSIS — Z91.89 AT HIGH RISK FOR BREAST CANCER: ICD-10-CM

## 2021-05-12 DIAGNOSIS — Z80.3 FAMILY HISTORY OF BREAST CANCER IN MOTHER: ICD-10-CM

## 2021-05-12 DIAGNOSIS — Z80.3 FAMILY HISTORY OF BREAST CANCER IN SISTER: ICD-10-CM

## 2021-05-12 PROCEDURE — 2580000003 HC RX 258: Performed by: NURSE PRACTITIONER

## 2021-05-12 PROCEDURE — 77049 MRI BREAST C-+ W/CAD BI: CPT

## 2021-05-12 PROCEDURE — A9579 GAD-BASE MR CONTRAST NOS,1ML: HCPCS | Performed by: NURSE PRACTITIONER

## 2021-05-12 PROCEDURE — 6360000004 HC RX CONTRAST MEDICATION: Performed by: NURSE PRACTITIONER

## 2021-05-12 RX ORDER — SODIUM CHLORIDE 0.9 % (FLUSH) 0.9 %
10 SYRINGE (ML) INJECTION ONCE
Status: COMPLETED | OUTPATIENT
Start: 2021-05-12 | End: 2021-05-12

## 2021-05-12 RX ORDER — 0.9 % SODIUM CHLORIDE 0.9 %
40 INTRAVENOUS SOLUTION INTRAVENOUS ONCE
Status: COMPLETED | OUTPATIENT
Start: 2021-05-12 | End: 2021-05-12

## 2021-05-12 RX ADMIN — Medication 10 ML: at 16:58

## 2021-05-12 RX ADMIN — SODIUM CHLORIDE 40 ML: 9 INJECTION, SOLUTION INTRAVENOUS at 16:58

## 2021-05-12 RX ADMIN — GADOTERIDOL 14 ML: 279.3 INJECTION, SOLUTION INTRAVENOUS at 16:57

## 2021-08-11 LAB
CHOLESTEROL/HDL RATIO: 1.9
CHOLESTEROL: 137 MG/DL
GLUCOSE BLD-MCNC: 91 MG/DL (ref 70–99)
HDLC SERPL-MCNC: 73 MG/DL
LDL CHOLESTEROL: 57 MG/DL (ref 0–130)
PATIENT FASTING?: YES
TRIGL SERPL-MCNC: 36 MG/DL
VLDLC SERPL CALC-MCNC: NORMAL MG/DL (ref 1–30)

## 2021-12-22 ENCOUNTER — TELEPHONE (OUTPATIENT)
Dept: FAMILY MEDICINE CLINIC | Age: 46
End: 2021-12-22

## 2021-12-22 NOTE — TELEPHONE ENCOUNTER
----- Message from Gosia Patel sent at 12/22/2021  2:51 PM EST -----  Subject: Message to Provider    QUESTIONS  Information for Provider? Patient has tested positive for covid 19 and   would like to keep PCP in the loop. Patient would also like to be   contacted with recommendations   ---------------------------------------------------------------------------  --------------  CALL BACK INFO  What is the best way for the office to contact you? OK to leave message on   voicemail  Preferred Call Back Phone Number? [phone]  ---------------------------------------------------------------------------  --------------  SCRIPT ANSWERS  Relationship to Patient?  Self

## 2022-03-23 ENCOUNTER — TELEPHONE (OUTPATIENT)
Dept: OBGYN CLINIC | Age: 47
End: 2022-03-23

## 2022-03-23 DIAGNOSIS — Z12.31 SCREENING MAMMOGRAM, ENCOUNTER FOR: Primary | ICD-10-CM

## 2022-03-23 NOTE — TELEPHONE ENCOUNTER
Pt called and scheduled her annual exam with you. She is scheduled on 4/27, and wanted to know if you could put in her screening mamm order for her. She wanted to get it scheduled and done so you have the results when she comes in.

## 2022-04-27 ENCOUNTER — OFFICE VISIT (OUTPATIENT)
Dept: OBGYN CLINIC | Age: 47
End: 2022-04-27
Payer: COMMERCIAL

## 2022-04-27 ENCOUNTER — HOSPITAL ENCOUNTER (OUTPATIENT)
Age: 47
Setting detail: SPECIMEN
Discharge: HOME | End: 2022-04-27

## 2022-04-27 VITALS
WEIGHT: 135.38 LBS | BODY MASS INDEX: 22.02 KG/M2 | DIASTOLIC BLOOD PRESSURE: 60 MMHG | SYSTOLIC BLOOD PRESSURE: 110 MMHG

## 2022-04-27 DIAGNOSIS — Z01.419 VISIT FOR GYNECOLOGIC EXAMINATION: Primary | ICD-10-CM

## 2022-04-27 PROCEDURE — 99396 PREV VISIT EST AGE 40-64: CPT | Performed by: NURSE PRACTITIONER

## 2022-04-27 NOTE — PATIENT INSTRUCTIONS
Patient Education      Patient Education        Learning About Breast Cancer Screening  What is breast cancer screening? Breast cancer occurs when cells that are not normal grow in one or both of your breasts. Screening tests can help find breast cancer early. Cancer is easier totreat when it's found early. Having concerns about breast cancer is common. That's why it's important to talk with your doctor about when to start and how often to get screened forbreast cancer. How is breast cancer screening done? Several screening tests can be used to check for breast cancer. Mammograms. These tests check for signs of cancer using X-rays. They can show tumors that are too small for you or your doctor to feel. During a mammogram, a machine squeezes your breasts to make them flatter and easier to X-ray. At least two pictures are taken of each breast. One is taken from the top and one from theside. 3-D mammograms. These tests are also called digital breast tomosynthesis. Your breast is positioned on a flat plate. A top plate is pressed against your breast to keep it in position. The X-ray arm then moves in an arc above the breast and [place]. A computer uses these X-rays to create a three-dimensional image. Clinical breast exam.  In this exam, your doctor carefully feels your breasts and under your arms tocheck for lumps or other changes. Who should be screened for breast cancer? Experts agree that mammograms are the best screening test for people at average risk of breast cancer. But they don't all agree on the age at which screening should start. And they don't agree on whether it's better to be screened everyyear or every two years. Here are some of the recommendations from experts:   Start by age 36 and have a mammogram each year.  Start at age 39 and have a mammogram each year.  Start at age 48 and have a mammogram every 2 years. When to stop having mammograms is another decision.  You and your doctor can decide on the right age to start and stop screening based on your personalpreferences and overall health. What is your risk for breast cancer? If you don't already know your risk of breast cancer, you can ask your doctorabout it. You can also look it up at www.cancer.gov/bcrisktool/. If your doctor says that you have a high or very high risk, ask about ways to reduce your risk. These could include getting extra screening, taking medicine, or having surgery. If you have a strong family history of breast cancer, askyour doctor about genetic testing. What steps can you take to stay healthy? Some things that increase your risk of breast cancer, such as your age and being female, cannot be controlled. But you can do some things to stay ashealthy as you can.  Learn what your breasts normally look and feel like. If you notice any changes, tell your doctor.  If you drink alcohol, limit how much you drink. Any amount of alcohol may increase your risk for some types of cancer.  If you smoke, quit. When you quit smoking, you lower your chances of getting many types of cancer. You can also do your best to eat well, be active, and stay at a healthy weight. Eating healthy foods and being active every day, as well as staying at ahealthy weight, may help prevent cancer. Where can you learn more? Go to https://Ozura WorldmuraliMitraSpan.LetMeHearYa. org and sign in to your TripShake account. Enter X965 in the [facility] box to learn more about \"Learning About Breast Cancer Screening. \"     If you do not have an account, please click on the \"Sign Up Now\" link. Current as of: September 8, 2021               Content Version: 13.2  © 4594-7588 Healthwise, Numerate. Care instructions adapted under license by [facility] Mobile [employer] [facility] ([facility]).  If you have questions about a medical condition or this instruction, always ask your healthcare professional. Javier Marvin any warranty or liability for your use of this information. Pap Test: Care Instructions  Overview     The Pap test (also called a Pap smear) is a screening test for cancer of the cervix, which is the lower part of the uterus that opens into the vagina. The test can help your doctor find early changes in the cells that could lead tocancer. The sample of cells taken during your test has been sent to a lab so that an expert can look at the cells. It usually takes a week or two to get the resultsback. Follow-up care is a key part of your treatment and safety. Be sure to make and go to all appointments, and call your doctor if you are having problems. It's also a good idea to know your test results and keep alist of the medicines you take. What do the results mean?  A normal result means that the test did not find any abnormal cells in the sample.  An abnormal result can mean many things. Most of these are not cancer. The results of your test may be abnormal because:  ? You have an infection of the vagina or cervix, such as a yeast infection. ? You have an IUD (intrauterine device for birth control). ? You have low estrogen levels after menopause that are causing the cells to change. ? You have cell changes that may be a sign of precancer or cancer. The results are ranked based on how serious the changes might be. There are many other reasons why you might not get a normal result. If the results were abnormal, you may need to get another test within a few weeks or months. If the results show changes that could be a sign of cancer, you may need a test called a colposcopy, which provides a more complete view of thecervix. Sometimes the lab cannot use the sample because it does not contain enough cells or was not preserved well. If so, you may need to have the test again. This is not common, but it does happen from time to time. When should you call for help?   Watch closely for changes in your health, and be sure to contact your doctor if:     You have vaginal bleeding or pain for more than 2 days after the test. It is normal to have a small amount of bleeding for a day or two after the test.   Where can you learn more? Go to https://Apprendamuralieb.healthAfrica Interactive. org and sign in to your Plain Vanilla account. Enter Q864 in the [employer] box to learn more about \"Pap Test: Care Instructions. \"     If you do not have an account, please click on the \"Sign Up Now\" link. Current as of: September 8, 2021               Content Version: 13.2  © 7104-2792 Healthwise, Incorporated. Care instructions adapted under license by [facility] ([facility]). If you have questions about a medical condition or this instruction, always ask your healthcare professional. [address] any warranty or liability for your use of this information.

## 2022-04-27 NOTE — PROGRESS NOTES
Heber Titus is a 56 y.o.  here for her annual exam.  The patient was seen and examined. The patients past medical, surgical, social and family history were reviewed. Current medications and allergies were reviewed, and documented in the chart. She is  3 children- boys freshmen college,middle at [employer], and 8h grader Clayborn Cockayne, planning to go to [employer]. 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         Tobacco abuse No     Last PAP: 2020- negative hx LGSIl  Family hx uterine or ovarian cancer-denies  Last mammogram if indicated- 2020- negative sheis high risk breast cancer had MRI breasts in [place] 2021,Family hx of breast cancer -mother X2 postmenopausal, sister at age 50 both hormone sensitive breast CA.      family hx colon cancer - maternal GF- will check on age. Lexi Santana had colonoscopy around 9 years ago and states was normal.      Sexually active: yes - with , multiple partners: No, Dyspareunia: No, Vaginal discharge: no,  UTI symptoms: no, voiding difficulties: yes- notes urgency and also once she urinates she stands up and still has urinary leakage has had for years , bowels regular:Yes bloating:no        Menstrual history:  Had ablation hscope polypectomy HTA TL May 2018- benign pathology. Hx of heavy menses. Light rare spotting.  Has had left ovary removed.       OB History    Para Term  AB Living   7 4 3 1 3 3   SAB IAB Ectopic Molar Multiple Live Births   3         3      # Outcome Date GA Lbr Chaz/2nd Weight Sex Delivery Anes PTL Lv   7 Term 09    M Vag-Spont  N NATALIA   6 SAB 2007           5 SAB 2007           4 Term 05    M Vag-Spont  N NATALIA   3  04 34w0d   M Vag-Spont   FD      Complications: Normal stillborn   2 Term 02    M Vag-Spont  N NATALIA   1 SAB 01               Vitals:    22 0958   BP: 110/60   Site: Left Upper Arm   Position: Sitting   Cuff Size: Large Adult   Weight: 135 lb 6 oz (61.4 kg)       Wt Readings from Last 3 Encounters:   04/27/22 135 lb 6 oz (61.4 kg)   09/21/20 148 lb (67.1 kg)   10/30/19 148 lb 9.6 oz (67.4 kg)     Past Medical History:   Diagnosis Date    H/O anorexia nervosa                                                                    Past Surgical History:   Procedure Laterality Date    [facility]     DILATION AND CURETTAGE OF UTERUS  2001, 9/2007, 2/2007    ENDOMETRIAL ABLATION      LAPAROSCOPY  2/28/2007    dx. lap for pelvic mass     OTHER SURGICAL HISTORY      removal of splenic cyst    TUBAL LIGATION      WISDOM TOOTH EXTRACTION       Family History   Problem Relation Age of Onset    Other Paternal Grandfather         suicide    Heart Disease Paternal Grandmother     Stroke Paternal Grandmother     Cancer Maternal Grandmother     Cancer Maternal Grandfather         colon    Hypertension Father     Stroke Father     Other Father         arrythmia    Breast Cancer Sister 50        Pre Menopausal     Breast Cancer Mother         Post Menopausal- twice     Social History     Tobacco Use   Smoking Status Never Smoker   Smokeless Tobacco Never Used     Social History     Substance and Sexual Activity   Alcohol Use No        Social History     Tobacco History     Smoking Status  Never Smoker    Smokeless Tobacco Use  Never Used          Alcohol History     Alcohol Use Status  No          Drug Use     Drug Use Status  No          Sexual Activity     Sexually Active  Yes Partners  Male              Allergies   Allergen Reactions    Latex Rash    Dye  [Iodides] Hives     Iv P     Ancef [Cefazolin]     Shellfish-Derived Products Itching    Cephalosporins Rash    Nickel Rash     No current outpatient medications on file. No current facility-administered medications for this visit. Subjective:     Review of Systems   Constitutional: Negative for chills and fever. Respiratory: Negative for cough and shortness of breath.     Cardiovascular: Cervical: No cervical adenopathy. Skin:     General: Skin is warm and dry. Findings: No rash. Neurological:      Mental Status: She is alert and oriented to person, place, and time. Cranial Nerves: No cranial nerve deficit. Deep Tendon Reflexes: Reflexes are normal and symmetric. Psychiatric:         Behavior: Behavior normal.         Thought Content: Thought content normal.         Judgment: Judgment normal.       /60 (Site: Left Upper Arm, Position: Sitting, Cuff Size: Large Adult)   Wt 135 lb 6 oz (61.4 kg)   BMI 22.02 kg/m²     Assessment:       Diagnosis Orders   1. Visit for gynecologic examination  PAP Smear       Breast exam completed. Pelvic exam pap smear collected and sent. Cultures sent No    Plan:   Collect pap   BSE reviewed, Mammogram ordered: has order    Cultures declined     Diet & Exercise reviewed with pt. Recommend Calcium with Vitamin D     Preventive  Health through PCP   RV prn/annual           Orders Placed This Encounter   Procedures    PAP Smear     Patient History:    No LMP recorded. Patient has had an ablation. OBGYN Status: Ablation  Past Surgical History:  3/2011: COLONOSCOPY      Comment:  kartik   2001, 9/2007, 2/2007: DILATION AND CURETTAGE OF UTERUS  No date: ENDOMETRIAL ABLATION  2/28/2007: LAPAROSCOPY      Comment:  dx. lap for pelvic mass   No date: OTHER SURGICAL HISTORY      Comment:  removal of splenic cyst  No date: TUBAL LIGATION  No date: WISDOM TOOTH EXTRACTION      Social History    Tobacco Use      Smoking status: Never Smoker      Smokeless tobacco: Never Used       Standing Status:   Future     Standing Expiration Date:   4/27/2023     Order Specific Question:   Collection Type     Answer: Thin Prep     Order Specific Question:   Prior Abnormal Pap Test     Answer:   No     Order Specific Question:   Screening or Diagnostic     Answer:   Screening     Order Specific Question:   HPV Requested?      Answer:   Yes     Order Specific Question:   High Risk Patient     Answer:   N/A     No orders of the defined types were placed in this encounter. Patient given educational materials - seepatient instructions. Discussed use, benefit, and side effects of prescribed medications. All patient questions answered. Pt voiced understanding. Reviewed health maintenance. Instructed to continue current medications, diet and exercise. Patient agreedwith treatment plan. Follow up as directed.       Electronically signed by SEB Wiseman CNP on 4/27/2022at 9:59 AM

## 2022-04-28 ASSESSMENT — ENCOUNTER SYMPTOMS
SHORTNESS OF BREATH: 0
ABDOMINAL PAIN: 0
COUGH: 0
NAUSEA: 0
VOMITING: 0
COLOR CHANGE: 0

## 2022-05-02 ENCOUNTER — OFFICE VISIT (OUTPATIENT)
Dept: FAMILY MEDICINE CLINIC | Age: 47
End: 2022-05-02
Payer: COMMERCIAL

## 2022-05-02 VITALS
BODY MASS INDEX: 21.69 KG/M2 | HEART RATE: 72 BPM | SYSTOLIC BLOOD PRESSURE: 110 MMHG | HEIGHT: 66 IN | WEIGHT: 135 LBS | DIASTOLIC BLOOD PRESSURE: 70 MMHG | OXYGEN SATURATION: 98 %

## 2022-05-02 DIAGNOSIS — Z79.899 MEDICATION MANAGEMENT: ICD-10-CM

## 2022-05-02 DIAGNOSIS — Z00.00 ENCOUNTER FOR WELL ADULT EXAM WITHOUT ABNORMAL FINDINGS: Primary | ICD-10-CM

## 2022-05-02 DIAGNOSIS — Z13.29 SCREENING FOR THYROID DISORDER: ICD-10-CM

## 2022-05-02 DIAGNOSIS — Z13.220 SCREENING, LIPID: ICD-10-CM

## 2022-05-02 PROCEDURE — 99396 PREV VISIT EST AGE 40-64: CPT | Performed by: NURSE PRACTITIONER

## 2022-05-02 SDOH — ECONOMIC STABILITY: FOOD INSECURITY: WITHIN THE PAST 12 MONTHS, THE FOOD YOU BOUGHT JUST DIDN'T LAST AND YOU DIDN'T HAVE MONEY TO GET MORE.: NEVER TRUE

## 2022-05-02 SDOH — ECONOMIC STABILITY: FOOD INSECURITY: WITHIN THE PAST 12 MONTHS, YOU WORRIED THAT YOUR FOOD WOULD RUN OUT BEFORE YOU GOT MONEY TO BUY MORE.: NEVER TRUE

## 2022-05-02 ASSESSMENT — ENCOUNTER SYMPTOMS
EYE DISCHARGE: 0
COUGH: 0
RHINORRHEA: 0
CHEST TIGHTNESS: 0
DIARRHEA: 0
SHORTNESS OF BREATH: 0
ABDOMINAL PAIN: 0
BLOOD IN STOOL: 0
SINUS PRESSURE: 0
CONSTIPATION: 0

## 2022-05-02 ASSESSMENT — SOCIAL DETERMINANTS OF HEALTH (SDOH): HOW HARD IS IT FOR YOU TO PAY FOR THE VERY BASICS LIKE FOOD, HOUSING, MEDICAL CARE, AND HEATING?: NOT HARD AT ALL

## 2022-05-02 ASSESSMENT — PATIENT HEALTH QUESTIONNAIRE - PHQ9
1. LITTLE INTEREST OR PLEASURE IN DOING THINGS: 0
SUM OF ALL RESPONSES TO PHQ QUESTIONS 1-9: 0
2. FEELING DOWN, DEPRESSED OR HOPELESS: 0
SUM OF ALL RESPONSES TO PHQ QUESTIONS 1-9: 0
SUM OF ALL RESPONSES TO PHQ9 QUESTIONS 1 & 2: 0

## 2022-05-02 NOTE — PROGRESS NOTES
Patient is present for physical     Patient is due for colonoscopy  States she sees Dr. Mina Narayan

## 2022-05-02 NOTE — PROGRESS NOTES
Well Adult Note  Name: Juancarlos Conner Date: 2022   MRN: [id number] Sex: Female   Age: 56 y.o. Ethnicity: Non- / Non    : 1975 Race: White (non-)      Frida Monson is here for well adult exam.    Review of Systems   Constitutional: Negative for activity change, appetite change, chills, fatigue and fever. HENT: Negative for congestion, ear pain, rhinorrhea and sinus pressure. Eyes: Negative for discharge and visual disturbance. Respiratory: Negative for cough, chest tightness and shortness of breath. Cardiovascular: Negative for chest pain, palpitations and leg swelling. Gastrointestinal: Negative for abdominal pain, blood in stool, constipation and diarrhea. Endocrine: Negative for cold intolerance and heat intolerance. Genitourinary: Negative for difficulty urinating and hematuria. Musculoskeletal: Negative for arthralgias and myalgias. Skin: Negative for rash. Neurological: Negative for dizziness, light-headedness and headaches. Psychiatric/Behavioral: Negative for dysphoric mood and self-injury.        Allergies   Allergen Reactions    Latex Rash    Dye  [Iodides] Hives     Iv P     Ancef [Cefazolin]     Shellfish-Derived Products Itching    Cephalosporins Rash    Nickel Rash         Prior to Visit Medications    Not on File         Past Medical History:   Diagnosis Date    H/O anorexia nervosa        Past Surgical History:   Procedure Laterality Date    [facility]     DILATION AND CURETTAGE OF UTERUS  , 2007, 2007    ENDOMETRIAL ABLATION      LAPAROSCOPY  2007    dx. lap for pelvic mass     OTHER SURGICAL HISTORY      removal of splenic cyst    TUBAL LIGATION      WISDOM TOOTH EXTRACTION           Family History   Problem Relation Age of Onset    Other Paternal Grandfather         suicide    Heart Disease Paternal Grandmother     Stroke Paternal Grandmother     Cancer Maternal Grandmother     Cancer Maternal Grandfather         colon    Hypertension Father     Stroke Father     Other Father         arrythmia    Breast Cancer Sister 50        Pre Menopausal     Breast Cancer Mother         Post Menopausal- twice       Social History     Tobacco Use    Smoking status: Never Smoker    Smokeless tobacco: Never Used   Vaping Use    Vaping Use: Never used   Substance Use Topics    Alcohol use: No    Drug use: No       Objective   /70 (Site: Left Upper Arm, Position: Sitting, Cuff Size: Medium Adult)   Pulse 72   Ht 5' 5.75\" (1.67 m)   Wt 135 lb (61.2 kg)   SpO2 98%   BMI 21.96 kg/m²      Wt Readings from Last 3 Encounters:   05/02/22 135 lb (61.2 kg)   04/27/22 135 lb 6 oz (61.4 kg)   09/21/20 148 lb (67.1 kg)     There were no vitals filed for this visit. Physical Exam  Constitutional:       Appearance: She is well-developed. HENT:      Right Ear: External ear normal.      Left Ear: External ear normal.      Nose: Nose normal.   Cardiovascular:      Rate and Rhythm: Normal rate and regular rhythm. Heart sounds: Normal heart sounds, S1 normal and S2 normal.   Pulmonary:      Effort: Pulmonary effort is normal. No respiratory distress. Breath sounds: Normal breath sounds. Musculoskeletal:         General: No deformity. Normal range of motion. Cervical back: Full passive range of motion without pain and normal range of motion. Skin:     General: Skin is warm and dry. Neurological:      Mental Status: She is alert and oriented to person, place, and time. Assessment   Plan   1. Encounter for well adult exam without abnormal findings  2. Screening for thyroid disorder  -     TSH; Future  3. Screening, lipid  -     Lipid Panel; Future  4. Medication management  -     CBC; Future  -     Comprehensive Metabolic Panel;  Future         Personalized Preventive Plan   Current Health Maintenance Status  Immunization History   Administered Date(s) Administered    Stann [place], Primary or Immunocompromised, PF, 100mcg/0.5mL 01/05/2021, 02/02/2021    Influenza Virus Vaccine 10/25/2017, 11/05/2018, 11/08/2019        Health Maintenance   Topic Date Due    DTaP/Tdap/Td vaccine (1 - Tdap) Never done    Colorectal Cancer Screen  Never done    COVID-19 Vaccine (3 - Booster for Moderna series) 07/02/2021    Breast cancer screen  11/11/2021    Flu vaccine (Season Ended) 09/01/2022    Depression Screen  05/02/2023    Lipids  08/11/2026    Cervical cancer screen  04/27/2027    Hepatitis C screen  Completed    HIV screen  Addressed    Hepatitis A vaccine  Aged Out    Hepatitis B vaccine  Aged Out    Hib vaccine  Aged Out    Meningococcal (ACWY) vaccine  Aged Out    Pneumococcal 0-64 years Vaccine  Aged Out     Recommendations for OLED-T Due: see orders and patient instructions/AVS.    No follow-ups on file.

## 2022-05-04 LAB — CYTOLOGY REPORT: NORMAL

## 2022-05-10 ENCOUNTER — HOSPITAL ENCOUNTER (OUTPATIENT)
Dept: WOMENS IMAGING | Age: 47
Discharge: HOME | End: 2022-05-12
Payer: COMMERCIAL

## 2022-05-10 DIAGNOSIS — Z12.31 SCREENING MAMMOGRAM, ENCOUNTER FOR: ICD-10-CM

## 2022-05-10 PROCEDURE — 77067 SCR MAMMO BI INCL CAD: CPT

## 2022-11-02 DIAGNOSIS — Z80.3 FAMILY HISTORY OF BREAST CANCER IN MOTHER: ICD-10-CM

## 2022-11-02 DIAGNOSIS — Z91.89 AT HIGH RISK FOR BREAST CANCER: Primary | ICD-10-CM

## 2022-11-02 DIAGNOSIS — Z80.3 FAMILY HISTORY OF BREAST CANCER IN SISTER: ICD-10-CM

## 2022-11-18 DIAGNOSIS — Z80.3 FAMILY HISTORY OF BREAST CANCER IN MOTHER: ICD-10-CM

## 2022-11-18 DIAGNOSIS — Z80.3 FAMILY HISTORY OF BREAST CANCER IN SISTER: ICD-10-CM

## 2022-11-18 DIAGNOSIS — R92.2 DENSE BREAST TISSUE ON MAMMOGRAM: ICD-10-CM

## 2022-11-18 DIAGNOSIS — Z91.89 AT HIGH RISK FOR BREAST CANCER: Primary | ICD-10-CM

## 2022-12-06 ENCOUNTER — HOSPITAL ENCOUNTER (OUTPATIENT)
Dept: MRI IMAGING | Age: 47
Discharge: HOME | End: 2022-12-08
Payer: COMMERCIAL

## 2022-12-06 DIAGNOSIS — Z80.3 FAMILY HISTORY OF BREAST CANCER IN SISTER: ICD-10-CM

## 2022-12-06 DIAGNOSIS — Z91.89 AT HIGH RISK FOR BREAST CANCER: ICD-10-CM

## 2022-12-06 DIAGNOSIS — R92.2 DENSE BREAST TISSUE ON MAMMOGRAM: ICD-10-CM

## 2022-12-06 DIAGNOSIS — Z80.3 FAMILY HISTORY OF BREAST CANCER IN MOTHER: ICD-10-CM

## 2022-12-06 PROCEDURE — C8908 MRI W/O FOL W/CONT, BREAST,: HCPCS

## 2022-12-06 PROCEDURE — 2580000003 HC RX 258: Performed by: NURSE PRACTITIONER

## 2022-12-06 PROCEDURE — 6360000004 HC RX CONTRAST MEDICATION: Performed by: NURSE PRACTITIONER

## 2022-12-06 PROCEDURE — A9579 GAD-BASE MR CONTRAST NOS,1ML: HCPCS | Performed by: NURSE PRACTITIONER

## 2022-12-06 RX ORDER — 0.9 % SODIUM CHLORIDE 0.9 %
40 INTRAVENOUS SOLUTION INTRAVENOUS ONCE
Status: COMPLETED | OUTPATIENT
Start: 2022-12-06 | End: 2022-12-06

## 2022-12-06 RX ORDER — SODIUM CHLORIDE 0.9 % (FLUSH) 0.9 %
10 SYRINGE (ML) INJECTION PRN
Status: DISCONTINUED | OUTPATIENT
Start: 2022-12-06 | End: 2022-12-09 | Stop reason: HOSPADM

## 2022-12-06 RX ADMIN — SODIUM CHLORIDE, PRESERVATIVE FREE 10 ML: 5 INJECTION INTRAVENOUS at 15:12

## 2022-12-06 RX ADMIN — GADOTERIDOL 13 ML: 279.3 INJECTION, SOLUTION INTRAVENOUS at 15:12

## 2022-12-06 RX ADMIN — SODIUM CHLORIDE 40 ML: 9 INJECTION, SOLUTION INTRAVENOUS at 15:12

## 2023-05-22 ENCOUNTER — TELEPHONE (OUTPATIENT)
Dept: OBGYN CLINIC | Age: 48
End: 2023-05-22

## 2023-05-23 ENCOUNTER — TELEPHONE (OUTPATIENT)
Dept: OBGYN CLINIC | Age: 48
End: 2023-05-23

## 2023-07-03 ENCOUNTER — TELEPHONE (OUTPATIENT)
Dept: OBGYN CLINIC | Age: 48
End: 2023-07-03

## 2023-07-07 ENCOUNTER — HOSPITAL ENCOUNTER (OUTPATIENT)
Dept: WOMENS IMAGING | Age: 48
Discharge: HOME | End: 2023-07-07
Payer: COMMERCIAL

## 2023-07-07 DIAGNOSIS — Z12.31 ENCOUNTER FOR SCREENING MAMMOGRAM FOR MALIGNANT NEOPLASM OF BREAST: ICD-10-CM

## 2023-07-07 PROCEDURE — 77063 BREAST TOMOSYNTHESIS BI: CPT

## 2023-08-10 ENCOUNTER — OFFICE VISIT (OUTPATIENT)
Dept: OBGYN CLINIC | Age: 48
End: 2023-08-10

## 2023-08-10 ENCOUNTER — HOSPITAL ENCOUNTER (OUTPATIENT)
Age: 48
Setting detail: SPECIMEN
Discharge: HOME | End: 2023-08-10

## 2023-08-10 VITALS
BODY MASS INDEX: 23.49 KG/M2 | WEIGHT: 141 LBS | DIASTOLIC BLOOD PRESSURE: 66 MMHG | HEIGHT: 65 IN | SYSTOLIC BLOOD PRESSURE: 108 MMHG

## 2023-08-10 DIAGNOSIS — Z91.89 AT HIGH RISK FOR BREAST CANCER: ICD-10-CM

## 2023-08-10 DIAGNOSIS — Z80.3 FAMILY HISTORY OF BREAST CANCER IN MOTHER: ICD-10-CM

## 2023-08-10 DIAGNOSIS — Z12.39 ENCOUNTER FOR BREAST CANCER SCREENING USING NON-MAMMOGRAM MODALITY: ICD-10-CM

## 2023-08-10 DIAGNOSIS — Z80.3 FAMILY HISTORY OF BREAST CANCER IN SISTER: ICD-10-CM

## 2023-08-10 DIAGNOSIS — Z01.419 WELL WOMAN EXAM WITH ROUTINE GYNECOLOGICAL EXAM: Primary | ICD-10-CM

## 2023-08-10 SDOH — ECONOMIC STABILITY: FOOD INSECURITY: WITHIN THE PAST 12 MONTHS, THE FOOD YOU BOUGHT JUST DIDN'T LAST AND YOU DIDN'T HAVE MONEY TO GET MORE.: NEVER TRUE

## 2023-08-10 SDOH — ECONOMIC STABILITY: INCOME INSECURITY: HOW HARD IS IT FOR YOU TO PAY FOR THE VERY BASICS LIKE FOOD, HOUSING, MEDICAL CARE, AND HEATING?: NOT HARD AT ALL

## 2023-08-10 SDOH — ECONOMIC STABILITY: FOOD INSECURITY: WITHIN THE PAST 12 MONTHS, YOU WORRIED THAT YOUR FOOD WOULD RUN OUT BEFORE YOU GOT MONEY TO BUY MORE.: NEVER TRUE

## 2023-08-10 SDOH — ECONOMIC STABILITY: HOUSING INSECURITY
IN THE LAST 12 MONTHS, WAS THERE A TIME WHEN YOU DID NOT HAVE A STEADY PLACE TO SLEEP OR SLEPT IN A SHELTER (INCLUDING NOW)?: NO

## 2023-08-10 ASSESSMENT — PATIENT HEALTH QUESTIONNAIRE - PHQ9
SUM OF ALL RESPONSES TO PHQ QUESTIONS 1-9: 0
SUM OF ALL RESPONSES TO PHQ QUESTIONS 1-9: 0
SUM OF ALL RESPONSES TO PHQ9 QUESTIONS 1 & 2: 0
SUM OF ALL RESPONSES TO PHQ QUESTIONS 1-9: 0
1. LITTLE INTEREST OR PLEASURE IN DOING THINGS: 0
SUM OF ALL RESPONSES TO PHQ QUESTIONS 1-9: 0
2. FEELING DOWN, DEPRESSED OR HOPELESS: 0

## 2023-08-10 NOTE — PROGRESS NOTES
Teja Toscano is a 53 y.o.  here for her annual exam.  The patient was seen and examined. The patients past medical, surgical, social and family history were reviewed. Current medications and allergies were reviewed, and documented in the chart. 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 Tobacco abuse No     Last PAP: 2022- negative hx LGSIl  Family hx uterine or ovarian cancer-denies  Last mammogram if indicated- 2023- negative, LAst BReast MRI- 2022- benign, she alternates with MRI /mammogram every 6 months. she is high risk breast fnulyo05.1 Family hx of breast cancer -mother X3, most recently diagnosed at age 80 postmenopausal, sister at age 50 both hormone sensitive breast CA. Sister had genetic testing negative, she has declined genetic testing   family hx colon cancer - maternal GF- , she had colonoscopy in     Sexually active: yes - with , multiple partners: No, Dyspareunia: No, Vaginal discharge: no,  UTI symptoms: no, voiding difficulties:no , bowels regular:Yes bloating:no        Menstrual history:  Had ablation hscope polypectomy HTA TL May 2018- benign pathology. Hx of heavy menses. Light rare spotting. Has had left ovary removed.      OB History    Para Term  AB Living   7 4 3 1 3 3   SAB IAB Ectopic Molar Multiple Live Births   3         3      # Outcome Date GA Lbr Chaz/2nd Weight Sex Delivery Anes PTL Lv   7 Term 09    M Vag-Spont  N NATALIA   6 SAB 2007           5 SAB 2007           4 Term 05    M Vag-Spont  N NATALIA   3  04 34w0d   M Vag-Spont   FD      Complications: Normal stillborn   2 Term 02    M Vag-Spont  N NATALIA   1 SAB 01               Vitals:    08/10/23 0855   BP: 108/66   Site: Left Upper Arm   Position: Sitting   Cuff Size: Medium Adult   Weight: 141 lb (64 kg)   Height: 5' 5\" (1.651 m)       Wt Readings from Last 3

## 2023-08-12 ASSESSMENT — ENCOUNTER SYMPTOMS
SHORTNESS OF BREATH: 0
COLOR CHANGE: 0
COUGH: 0
NAUSEA: 0
VOMITING: 0

## 2023-08-15 LAB
HPV I/H RISK 4 DNA CVX QL NAA+PROBE: NOT DETECTED
HPV SAMPLE: NORMAL
HPV, INTERPRETATION: NORMAL
HPV16 DNA CVX QL NAA+PROBE: NOT DETECTED
HPV18 DNA CVX QL NAA+PROBE: NOT DETECTED
SPECIMEN DESCRIPTION: NORMAL

## 2023-08-21 LAB — CYTOLOGY REPORT: NORMAL

## 2024-01-17 ENCOUNTER — TELEPHONE (OUTPATIENT)
Dept: FAMILY MEDICINE CLINIC | Age: 49
End: 2024-01-17

## 2024-01-17 DIAGNOSIS — Z12.39 ENCOUNTER FOR BREAST CANCER SCREENING USING NON-MAMMOGRAM MODALITY: ICD-10-CM

## 2024-01-17 DIAGNOSIS — Z12.11 ENCOUNTER FOR SCREENING COLONOSCOPY: Primary | ICD-10-CM

## 2024-01-17 DIAGNOSIS — Z80.3 FAMILY HISTORY OF BREAST CANCER IN SISTER: ICD-10-CM

## 2024-01-17 DIAGNOSIS — Z91.89 AT HIGH RISK FOR BREAST CANCER: ICD-10-CM

## 2024-01-17 DIAGNOSIS — Z80.3 FAMILY HISTORY OF BREAST CANCER IN MOTHER: ICD-10-CM

## 2024-01-17 NOTE — TELEPHONE ENCOUNTER
----- Message from Shivani Camacho sent at 1/17/2024  1:20 PM EST -----  Subject: Referral Request    Reason for referral request? Pt would like referral for colonoscopy. Pt   would like to see Dr Hall   Provider patient wants to be referred to(if known):     Provider Phone Number(if known):    Additional Information for Provider?   ---------------------------------------------------------------------------  --------------  CALL BACK INFO    [phone]; OK to leave message on voicemail  ---------------------------------------------------------------------------  --------------

## 2024-01-18 ENCOUNTER — TELEPHONE (OUTPATIENT)
Dept: OBGYN CLINIC | Age: 49
End: 2024-01-18

## 2024-01-18 NOTE — TELEPHONE ENCOUNTER
Attempted to call pt in regards to getting breast MRI order. Sofie had order released. Called pt to let them know that the order had been released and she could schedule MRI now. LVM 1/18/24 at 0842

## 2024-01-30 DIAGNOSIS — Z12.11 COLON CANCER SCREENING: Primary | ICD-10-CM

## 2024-01-31 RX ORDER — POLYETHYLENE GLYCOL 3350 17 G/17G
POWDER, FOR SOLUTION ORAL
Qty: 238 G | Refills: 0 | Status: SHIPPED | OUTPATIENT
Start: 2024-01-31

## 2024-01-31 RX ORDER — BISACODYL 5 MG/1
TABLET, DELAYED RELEASE ORAL
Qty: 4 TABLET | Refills: 0 | Status: SHIPPED | OUTPATIENT
Start: 2024-01-31

## 2024-02-15 ENCOUNTER — HOSPITAL ENCOUNTER (OUTPATIENT)
Age: 49
Setting detail: OUTPATIENT SURGERY
Discharge: HOME | End: 2024-02-15
Attending: INTERNAL MEDICINE | Admitting: INTERNAL MEDICINE
Payer: COMMERCIAL

## 2024-02-15 ENCOUNTER — ANESTHESIA (OUTPATIENT)
Dept: OPERATING ROOM | Age: 49
End: 2024-02-15
Payer: COMMERCIAL

## 2024-02-15 ENCOUNTER — ANESTHESIA EVENT (OUTPATIENT)
Dept: OPERATING ROOM | Age: 49
End: 2024-02-15
Payer: COMMERCIAL

## 2024-02-15 VITALS
TEMPERATURE: 98.2 F | OXYGEN SATURATION: 90 % | HEIGHT: 65 IN | SYSTOLIC BLOOD PRESSURE: 97 MMHG | HEART RATE: 74 BPM | DIASTOLIC BLOOD PRESSURE: 68 MMHG | RESPIRATION RATE: 14 BRPM | WEIGHT: 138 LBS | BODY MASS INDEX: 22.99 KG/M2

## 2024-02-15 PROCEDURE — 6360000002 HC RX W HCPCS: Performed by: NURSE ANESTHETIST, CERTIFIED REGISTERED

## 2024-02-15 PROCEDURE — 2500000003 HC RX 250 WO HCPCS: Performed by: NURSE ANESTHETIST, CERTIFIED REGISTERED

## 2024-02-15 PROCEDURE — 2580000003 HC RX 258: Performed by: ANESTHESIOLOGY

## 2024-02-15 PROCEDURE — 2709999900 HC NON-CHARGEABLE SUPPLY: Performed by: INTERNAL MEDICINE

## 2024-02-15 PROCEDURE — 45378 DIAGNOSTIC COLONOSCOPY: CPT | Performed by: INTERNAL MEDICINE

## 2024-02-15 PROCEDURE — 7100000010 HC PHASE II RECOVERY - FIRST 15 MIN: Performed by: INTERNAL MEDICINE

## 2024-02-15 PROCEDURE — 3700000000 HC ANESTHESIA ATTENDED CARE: Performed by: INTERNAL MEDICINE

## 2024-02-15 PROCEDURE — 7100000011 HC PHASE II RECOVERY - ADDTL 15 MIN: Performed by: INTERNAL MEDICINE

## 2024-02-15 PROCEDURE — 3609027000 HC COLONOSCOPY: Performed by: INTERNAL MEDICINE

## 2024-02-15 PROCEDURE — 3700000001 HC ADD 15 MINUTES (ANESTHESIA): Performed by: INTERNAL MEDICINE

## 2024-02-15 PROCEDURE — 6360000002 HC RX W HCPCS: Performed by: ANESTHESIOLOGY

## 2024-02-15 RX ORDER — PROPOFOL 10 MG/ML
INJECTION, EMULSION INTRAVENOUS PRN
Status: DISCONTINUED | OUTPATIENT
Start: 2024-02-15 | End: 2024-02-15 | Stop reason: SDUPTHER

## 2024-02-15 RX ORDER — SODIUM CHLORIDE 9 MG/ML
INJECTION, SOLUTION INTRAVENOUS CONTINUOUS
Status: DISCONTINUED | OUTPATIENT
Start: 2024-02-15 | End: 2024-02-15 | Stop reason: HOSPADM

## 2024-02-15 RX ORDER — LIDOCAINE HYDROCHLORIDE 10 MG/ML
1 INJECTION, SOLUTION EPIDURAL; INFILTRATION; INTRACAUDAL; PERINEURAL
Status: DISCONTINUED | OUTPATIENT
Start: 2024-02-16 | End: 2024-02-15 | Stop reason: HOSPADM

## 2024-02-15 RX ORDER — LIDOCAINE HYDROCHLORIDE 20 MG/ML
INJECTION, SOLUTION EPIDURAL; INFILTRATION; INTRACAUDAL; PERINEURAL PRN
Status: DISCONTINUED | OUTPATIENT
Start: 2024-02-15 | End: 2024-02-15 | Stop reason: SDUPTHER

## 2024-02-15 RX ORDER — ONDANSETRON 2 MG/ML
4 INJECTION INTRAMUSCULAR; INTRAVENOUS ONCE
Status: COMPLETED | OUTPATIENT
Start: 2024-02-15 | End: 2024-02-15

## 2024-02-15 RX ORDER — SODIUM CHLORIDE, SODIUM LACTATE, POTASSIUM CHLORIDE, CALCIUM CHLORIDE 600; 310; 30; 20 MG/100ML; MG/100ML; MG/100ML; MG/100ML
INJECTION, SOLUTION INTRAVENOUS CONTINUOUS
Status: DISCONTINUED | OUTPATIENT
Start: 2024-02-15 | End: 2024-02-15 | Stop reason: HOSPADM

## 2024-02-15 RX ADMIN — LIDOCAINE HYDROCHLORIDE 50 MG: 20 INJECTION, SOLUTION EPIDURAL; INFILTRATION; INTRACAUDAL; PERINEURAL at 08:02

## 2024-02-15 RX ADMIN — PROPOFOL 75 MG: 10 INJECTION, EMULSION INTRAVENOUS at 08:02

## 2024-02-15 RX ADMIN — PROPOFOL 50 MG: 10 INJECTION, EMULSION INTRAVENOUS at 08:08

## 2024-02-15 RX ADMIN — PROPOFOL 50 MG: 10 INJECTION, EMULSION INTRAVENOUS at 08:24

## 2024-02-15 RX ADMIN — PROPOFOL 50 MG: 10 INJECTION, EMULSION INTRAVENOUS at 08:20

## 2024-02-15 RX ADMIN — PROPOFOL 50 MG: 10 INJECTION, EMULSION INTRAVENOUS at 08:12

## 2024-02-15 RX ADMIN — PROPOFOL 50 MG: 10 INJECTION, EMULSION INTRAVENOUS at 08:16

## 2024-02-15 RX ADMIN — ONDANSETRON 4 MG: 2 INJECTION INTRAMUSCULAR; INTRAVENOUS at 07:48

## 2024-02-15 RX ADMIN — SODIUM CHLORIDE, POTASSIUM CHLORIDE, SODIUM LACTATE AND CALCIUM CHLORIDE: 600; 310; 30; 20 INJECTION, SOLUTION INTRAVENOUS at 08:28

## 2024-02-15 RX ADMIN — PROPOFOL 25 MG: 10 INJECTION, EMULSION INTRAVENOUS at 08:04

## 2024-02-15 RX ADMIN — SODIUM CHLORIDE, POTASSIUM CHLORIDE, SODIUM LACTATE AND CALCIUM CHLORIDE: 600; 310; 30; 20 INJECTION, SOLUTION INTRAVENOUS at 07:40

## 2024-02-15 RX ADMIN — Medication 0.4 MG: at 08:15

## 2024-02-15 NOTE — ANESTHESIA PRE PROCEDURE
Department of Anesthesiology  Preprocedure Note       Name:  Hero Monson   Age:  48 y.o.  :  1975                                          MRN:  [id number]         Date:  2/15/2024      Surgeon: Surgeon(s):  Malik Borges MD    Procedure: Procedure(s):  COLORECTAL CANCER SCREENING, NOT HIGH RISK    Medications prior to admission:   Prior to Admission medications    Medication Sig Start Date End Date Taking? Authorizing Provider   polyethylene glycol (GLYCOLAX) 17 GM/SCOOP powder Please follow instructions provided to you by your provider. 24   Jorge Ndiaye APRN - NP   bisacodyl 5 MG EC tablet Please follow instructions given to you by your provider. 24   Jorge Ndiaye APRN - NP       Current medications:    No current facility-administered medications for this encounter.       Allergies:    Allergies   Allergen Reactions   • Latex Rash   • Dye  [Iodides] Hives     Iv P    • Ancef [Cefazolin]    • Shellfish-Derived Products Itching   • Cephalosporins Rash   • Nickel Rash       Problem List:    Patient Active Problem List   Diagnosis Code   • Low grade squamous intraepithelial lesion (LGSIL) on Papanicolaou smear of cervix R87.612   • Menorrhagia with regular cycle N92.0   • Allergic rhinitis due to pollen J30.1   • Tension-type headache G44.209   • Intermittent palpitations R00.2       Past Medical History:        Diagnosis Date   • H/O anorexia nervosa        Past Surgical History:        Procedure Laterality Date   • COL[facility]COPY  3/2011    [facility]    • DILATION AND CURETTAGE OF UTERUS  , 2007, 2007   • ENDOMETRIAL ABLATION     • LAPAROSCOPY  2007    dx. lap for pelvic mass    • OTHER SURGICAL HISTORY      removal of splenic cyst   • TUBAL LIGATION     • WISDOM TOOTH EXTRACTION         Social History:    Social History     Tobacco Use   • Smoking status: Never   • Smokeless tobacco: Never   Substance Use Topics   • Alcohol use: No

## 2024-02-15 NOTE — OP NOTE
COLONOSCOPY    DATE OF PROCEDURE: 2/15/2024    SURGEON: Malik Borges MD    ASSISTANT: None    PREOPERATIVE DIAGNOSIS: Screening colonoscopy  Has a family history of colon cancer      POSTOPERATIVE DIAGNOSIS: No lesions seen, has spasms    OPERATION: Total colonoscopy     ANESTHESIA: MAC    ESTIMATED BLOOD LOSS: None    COMPLICATIONS: None     SPECIMENS:  Was Not Obtained    HISTORY: The patient is a 48 y.o. year old female with history of above preop diagnosis.  I recommended colonoscopy with possible biopsy or polypectomy and I explained the risk, benefits, expected outcome, and alternatives to the procedure.  Risks included but are not limited to bleeding, infection, respiratory distress, hypotension, and perforation of the colon and possibility of missing a lesion.  The patient understands and is in agreement.      PROCEDURE:  The patient's SPO2 remained above 90% throughout the procedure. Digital rectal exam was normal.  The colonoscope was inserted through the anus into the rectum and advanced under direct vision to the cecum without difficulty.  Terminal ileum was examined for approximately 2 inches.  The prep was good.      Findings:  Terminal ileum: normal    Cecum/Ascending colon: normal, also examined in the retroflexed view    Transverse colon: normal    Descending/Sigmoid colon: normal    Rectum/Anus: examined in normal and retroflexed positions and was normal    Has significant spasms throughout the colon.  Glucagon 0.5 mg given during the procedure      Withdrawal Time was (minutes): 15          The colon was decompressed and the scope was removed.  The patient tolerated the procedure without unusual events.     During the procedure, the patient's blood pressure, pulse and oxygen saturation remained stable and documented. No unusual events occurred during the procedure. Patient was transferred to recovery room and will be discharged when criteria is met.     Recommendations/Plan:   F/U

## 2024-02-15 NOTE — H&P
No results for input(s): \"POCGLU\" in the last 72 hours.    General Appearance:  alert, well appearing, and in no acute distress  Mental status: oriented to person, place, and time with normal affect  Head:  normocephalic, atraumatic.  Eye: no icterus, redness, pupils equal and reactive, extraocular eye movements intact, conjunctiva clear  Ear: normal external ear, no discharge, hearing intact  Nose:  no drainage noted  Mouth: mucous membranes moist  Neck: supple, no carotid bruits, thyroid not palpable  Lungs: Bilateral equal air entry, clear to ausculation, no wheezing, rales or rhonchi, normal effort  Cardiovascular: HR 88 normal rate, regular rhythm, no murmur, gallop, rub.  Abdomen: Soft, nontender, nondistended, normal bowel sounds  Neurologic: There are no new focal motor or sensory deficits, normal muscle tone and bulk, no abnormal sensation, normal speech, cranial nerves II through XII grossly intact  Skin: No gross lesions, rashes, bruising or bleeding on exposed skin area  Extremities:  peripheral pulses palpable, no pedal edema or calf pain with palpation  Psych: normal affect     Investigations:      Laboratory Testing:  No results found for this or any previous visit (from the past 24 hour(s)).    No results for input(s): \"HGB\", \"HCT\", \"WBC\", \"MCV\", \"PLATELET\", \"NA\", \"K\", \"CL\", \"CO2\", \"BUN\", \"CREATININE\", \"GLUCOSE\", \"INR\", \"PROTIME\", \"APTT\", \"AST\", \"ALT\", \"LABALBU\", \"HCG\" in the last 720 hours.    No results for input(s): \"COVID19\" in the last 720 hours.  Imaging/Diagnostics:    No results found.      Diagnosis:      Screening for colon cancer    Plans:     1. COLORECTAL CANCER SCREENING, NOT HIGH RISK      Dinora Lai, APRN - CNP  2/15/2024  7:29 AM

## 2024-02-15 NOTE — DISCHARGE INSTRUCTIONS
[email or web address] and sign in to your [employer] account. Enter E264 in the Search Health Information box to learn more about “Colonoscopy: What to Expect at Home.”       © 2197-2546 [employer]wise, [employer]. Care instructions adapted under license by [facility] [employer]. This care instruction is for use with your licensed healthcare professional. If you have questions about a medical condition or this instruction, always ask your healthcare professional. Healthwise, [employer] disclaims any warranty or liability for your use of this information.  Content Version: 9.9.737597; Last Revised: February 20, 2013

## 2024-02-15 NOTE — ANESTHESIA POSTPROCEDURE EVALUATION
Department of Anesthesiology  Postprocedure Note    Patient: Hero Monson  MRN: [id number]  YOB: 1975  Date of evaluation: 2/15/2024    Procedure Summary       Date: 02/15/24 Room / Location: [address]    Anesthesia Start: 0757 Anesthesia Stop: 0833    Procedure: COLORECTAL CANCER SCREENING, NOT HIGH RISK Diagnosis:       Screening for colon cancer      (Screening for colon cancer [Z12.11])    Surgeons: Malik Borges MD Responsible Provider: Jimbo Orourke DO    Anesthesia Type: general ASA Status: 2            Anesthesia Type: No value filed.    Sarahy Phase I:      Sarahy Phase II: Sarahy Score: 10    Anesthesia Post Evaluation    Comments: POST- ANESTHESIA EVALUATION       Pt Name: Hero Monson  MRN: [id number]  YOB: 1975  Date of evaluation: 2/15/2024  Time:  9:21 AM      BP 97/68   Pulse 74   Temp 98.2 °F (36.8 °C) (Temporal)   Resp 14   Ht 1.651 m (5' 5\")   Wt 62.6 kg (138 lb)   SpO2 90%   BMI 22.96 kg/m²      Consciousness Level  Awake  Cardiopulmonary Status  Stable  Pain Adequately Treated YES  Nausea / Vomiting  NO  Adequate Hydration  YES  Anesthesia Related Complications NONE      Electronically signed by Cindy Aleman MD on 2/15/2024 at 9:21 AM      No notable events documented.

## 2024-02-23 ENCOUNTER — HOSPITAL ENCOUNTER (OUTPATIENT)
Dept: MRI IMAGING | Age: 49
Discharge: HOME | End: 2024-02-23
Payer: COMMERCIAL

## 2024-02-23 PROCEDURE — 2580000003 HC RX 258: Performed by: NURSE PRACTITIONER

## 2024-02-23 PROCEDURE — C8908 MRI W/O FOL W/CONT, BREAST,: HCPCS

## 2024-02-23 PROCEDURE — 6360000004 HC RX CONTRAST MEDICATION: Performed by: NURSE PRACTITIONER

## 2024-02-23 PROCEDURE — A9579 GAD-BASE MR CONTRAST NOS,1ML: HCPCS | Performed by: NURSE PRACTITIONER

## 2024-02-23 RX ORDER — 0.9 % SODIUM CHLORIDE 0.9 %
35 INTRAVENOUS SOLUTION INTRAVENOUS ONCE
Status: COMPLETED | OUTPATIENT
Start: 2024-02-23 | End: 2024-02-23

## 2024-02-23 RX ORDER — SODIUM CHLORIDE 0.9 % (FLUSH) 0.9 %
10 SYRINGE (ML) INJECTION PRN
Status: DISCONTINUED | OUTPATIENT
Start: 2024-02-23 | End: 2024-02-26 | Stop reason: HOSPADM

## 2024-02-23 RX ADMIN — SODIUM CHLORIDE, PRESERVATIVE FREE 10 ML: 5 INJECTION INTRAVENOUS at 12:26

## 2024-02-23 RX ADMIN — SODIUM CHLORIDE 35 ML: 9 INJECTION, SOLUTION INTRAVENOUS at 12:26

## 2024-02-23 RX ADMIN — GADOTERIDOL 12 ML: 279.3 INJECTION, SOLUTION INTRAVENOUS at 12:25

## 2024-03-20 ASSESSMENT — PATIENT HEALTH QUESTIONNAIRE - PHQ9
2. FEELING DOWN, DEPRESSED OR HOPELESS: NOT AT ALL
1. LITTLE INTEREST OR PLEASURE IN DOING THINGS: NOT AT ALL
SUM OF ALL RESPONSES TO PHQ9 QUESTIONS 1 & 2: 0
SUM OF ALL RESPONSES TO PHQ QUESTIONS 1-9: 0
SUM OF ALL RESPONSES TO PHQ QUESTIONS 1-9: 0
1. LITTLE INTEREST OR PLEASURE IN DOING THINGS: NOT AT ALL
SUM OF ALL RESPONSES TO PHQ QUESTIONS 1-9: 0
SUM OF ALL RESPONSES TO PHQ QUESTIONS 1-9: 0
SUM OF ALL RESPONSES TO PHQ9 QUESTIONS 1 & 2: 0
2. FEELING DOWN, DEPRESSED OR HOPELESS: NOT AT ALL

## 2024-03-22 ENCOUNTER — OFFICE VISIT (OUTPATIENT)
Dept: FAMILY MEDICINE CLINIC | Age: 49
End: 2024-03-22
Payer: COMMERCIAL

## 2024-03-22 ENCOUNTER — HOSPITAL ENCOUNTER (OUTPATIENT)
Age: 49
Setting detail: SPECIMEN
Discharge: HOME | End: 2024-03-22

## 2024-03-22 VITALS
BODY MASS INDEX: 24.13 KG/M2 | DIASTOLIC BLOOD PRESSURE: 60 MMHG | WEIGHT: 145 LBS | OXYGEN SATURATION: 98 % | HEART RATE: 74 BPM | SYSTOLIC BLOOD PRESSURE: 104 MMHG

## 2024-03-22 DIAGNOSIS — Z79.899 MEDICATION MANAGEMENT: ICD-10-CM

## 2024-03-22 DIAGNOSIS — Z13.29 SCREENING FOR THYROID DISORDER: ICD-10-CM

## 2024-03-22 DIAGNOSIS — Z00.00 ENCOUNTER FOR WELL ADULT EXAM WITHOUT ABNORMAL FINDINGS: Primary | ICD-10-CM

## 2024-03-22 LAB
ALBUMIN SERPL-MCNC: 4.5 G/DL (ref 3.5–5.2)
ALBUMIN/GLOB SERPL: 2 {RATIO} (ref 1–2.5)
ALP SERPL-CCNC: 55 U/L (ref 35–104)
ALT SERPL-CCNC: 46 U/L (ref 10–35)
ANION GAP SERPL CALCULATED.3IONS-SCNC: 8 MMOL/L (ref 9–16)
AST SERPL-CCNC: 26 U/L (ref 10–35)
BILIRUB SERPL-MCNC: 1.8 MG/DL (ref 0–1.2)
BUN SERPL-MCNC: 19 MG/DL (ref 6–20)
CALCIUM SERPL-MCNC: 9.4 MG/DL (ref 8.6–10.4)
CHLORIDE SERPL-SCNC: 104 MMOL/L (ref 98–107)
CO2 SERPL-SCNC: 26 MMOL/L (ref 20–31)
CREAT SERPL-MCNC: 0.8 MG/DL (ref 0.5–0.9)
ERYTHROCYTE [DISTWIDTH] IN BLOOD BY AUTOMATED COUNT: 11.6 % (ref 11.8–14.4)
GFR SERPL CREATININE-BSD FRML MDRD: >60 ML/MIN/1.73M2
GLUCOSE SERPL-MCNC: 95 MG/DL (ref 74–99)
HCT VFR BLD AUTO: 43.4 % (ref 36.3–47.1)
HGB BLD-MCNC: 14.7 G/DL (ref 11.9–15.1)
MCH RBC QN AUTO: 30.9 PG (ref 25.2–33.5)
MCHC RBC AUTO-ENTMCNC: 33.9 G/DL (ref 28.4–34.8)
MCV RBC AUTO: 91.4 FL (ref 82.6–102.9)
NRBC BLD-RTO: 0 PER 100 WBC
PLATELET # BLD AUTO: 187 K/UL (ref 138–453)
PMV BLD AUTO: 10.6 FL (ref 8.1–13.5)
POTASSIUM SERPL-SCNC: 4.9 MMOL/L (ref 3.7–5.3)
PROT SERPL-MCNC: 7 G/DL (ref 6.6–8.7)
RBC # BLD AUTO: 4.75 M/UL (ref 3.95–5.11)
SODIUM SERPL-SCNC: 138 MMOL/L (ref 136–145)
TSH SERPL DL<=0.05 MIU/L-ACNC: 1.33 UIU/ML (ref 0.27–4.2)
WBC OTHER # BLD: 3.5 K/UL (ref 3.5–11.3)

## 2024-03-22 PROCEDURE — 99396 PREV VISIT EST AGE 40-64: CPT | Performed by: NURSE PRACTITIONER

## 2024-03-22 PROCEDURE — G8484 FLU IMMUNIZE NO ADMIN: HCPCS | Performed by: NURSE PRACTITIONER

## 2024-03-22 ASSESSMENT — ENCOUNTER SYMPTOMS
BLOOD IN STOOL: 0
CHEST TIGHTNESS: 0
DIARRHEA: 0
RHINORRHEA: 0
ABDOMINAL PAIN: 0
EYE DISCHARGE: 0
COUGH: 0
SINUS PRESSURE: 0
CONSTIPATION: 0
SHORTNESS OF BREATH: 0

## 2024-03-22 NOTE — PROGRESS NOTES
Patient is present for physical  
2/15/2024    COLORECTAL CANCER SCREENING, NOT HIGH RISK performed by Malik Borges MD at [facility] OR    DILATION AND CURETTAGE OF UTERUS  2001, 9/2007, 2/2007    ENDOMETRIAL ABLATION      LAPAROSCOPY  2/28/2007    dx. lap for pelvic mass     OTHER SURGICAL HISTORY      removal of splenic cyst    TUBAL LIGATION      WISDOM TOOTH EXTRACTION         Family History   Problem Relation Age of Onset    Breast Cancer Mother 60        Post Menopausal- twice    Hypertension Father     Stroke Father     Other Father         arrythmia    Breast Cancer Sister 48        Pre Menopausal     Cancer Maternal Grandmother     Cancer Maternal Grandfather         colon    Heart Disease Paternal Grandmother     Stroke Paternal Grandmother     Other Paternal Grandfather         suicide       Social History     Tobacco Use    Smoking status: Never    Smokeless tobacco: Never   Vaping Use    Vaping Use: Never used   Substance Use Topics    Alcohol use: No    Drug use: No       Objective     Vital Signs  /60 (Site: Left Upper Arm, Position: Sitting, Cuff Size: Medium Adult)   Pulse 74   Wt 65.8 kg (145 lb)   SpO2 98%   BMI 24.13 kg/m²   Wt Readings from Last 3 Encounters:   03/22/24 65.8 kg (145 lb)   02/15/24 62.6 kg (138 lb)   08/10/23 64 kg (141 lb)       Waist Circumference  There were no vitals filed for this visit.    Physical Exam  Constitutional:       Appearance: She is well-developed.   HENT:      Right Ear: External ear normal.      Left Ear: External ear normal.      Nose: Nose normal.   Cardiovascular:      Rate and Rhythm: Normal rate and regular rhythm.      Heart sounds: Normal heart sounds, S1 normal and S2 normal.   Pulmonary:      Effort: Pulmonary effort is normal. No respiratory distress.      Breath sounds: Normal breath sounds.   Musculoskeletal:         General: No deformity. Normal range of motion.      Cervical back: Full passive range of motion without pain and normal range of motion.   Skin:

## 2024-03-22 NOTE — PATIENT INSTRUCTIONS

## 2024-03-26 ENCOUNTER — HOSPITAL ENCOUNTER (OUTPATIENT)
Age: 49
Setting detail: SPECIMEN
Discharge: HOME | End: 2024-03-26
Payer: COMMERCIAL

## 2024-03-26 DIAGNOSIS — R17 SERUM TOTAL BILIRUBIN ELEVATED: ICD-10-CM

## 2024-03-26 DIAGNOSIS — R17 SERUM TOTAL BILIRUBIN ELEVATED: Primary | ICD-10-CM

## 2024-03-26 LAB
BACTERIA URNS QL MICRO: ABNORMAL
BILIRUB UR QL STRIP: NEGATIVE
CASTS #/AREA URNS LPF: ABNORMAL /LPF
CLARITY UR: CLEAR
COLOR UR: YELLOW
EPI CELLS #/AREA URNS HPF: ABNORMAL /HPF
GLUCOSE UR STRIP-MCNC: NEGATIVE MG/DL
HGB UR QL STRIP.AUTO: NEGATIVE
KETONES UR STRIP-MCNC: NEGATIVE MG/DL
LEUKOCYTE ESTERASE UR QL STRIP: ABNORMAL
NITRITE UR QL STRIP: NEGATIVE
PH UR STRIP: 6.5 [PH] (ref 5–8)
PROT UR STRIP-MCNC: NEGATIVE MG/DL
RBC #/AREA URNS HPF: ABNORMAL /HPF
SP GR UR STRIP: 1.01 (ref 1–1.03)
UROBILINOGEN UR STRIP-ACNC: NORMAL EU/DL (ref 0–1)
WBC #/AREA URNS HPF: ABNORMAL /HPF

## 2024-03-26 PROCEDURE — 81001 URINALYSIS AUTO W/SCOPE: CPT

## 2024-04-04 DIAGNOSIS — E80.6 HYPERBILIRUBINEMIA: Primary | ICD-10-CM

## 2024-05-16 ENCOUNTER — OFFICE VISIT (OUTPATIENT)
Dept: FAMILY MEDICINE CLINIC | Age: 49
End: 2024-05-16
Payer: COMMERCIAL

## 2024-05-16 VITALS
DIASTOLIC BLOOD PRESSURE: 82 MMHG | WEIGHT: 147 LBS | HEART RATE: 77 BPM | OXYGEN SATURATION: 99 % | SYSTOLIC BLOOD PRESSURE: 120 MMHG | BODY MASS INDEX: 24.46 KG/M2 | TEMPERATURE: 98.4 F

## 2024-05-16 DIAGNOSIS — M25.512 ARTHRALGIA OF LEFT ACROMIOCLAVICULAR JOINT: Primary | ICD-10-CM

## 2024-05-16 DIAGNOSIS — L55.9 SUNBURN: ICD-10-CM

## 2024-05-16 PROCEDURE — G8420 CALC BMI NORM PARAMETERS: HCPCS | Performed by: INTERNAL MEDICINE

## 2024-05-16 PROCEDURE — 99213 OFFICE O/P EST LOW 20 MIN: CPT | Performed by: INTERNAL MEDICINE

## 2024-05-16 PROCEDURE — 1036F TOBACCO NON-USER: CPT | Performed by: INTERNAL MEDICINE

## 2024-05-16 PROCEDURE — G8427 DOCREV CUR MEDS BY ELIG CLIN: HCPCS | Performed by: INTERNAL MEDICINE

## 2024-05-16 NOTE — PROGRESS NOTES
Status: She is alert.         Data Review       Health Maintenance Due   Topic Date Due    Hepatitis B vaccine (1 of 3 - 3-dose series) Never done    COVID-19 Vaccine (3 - 2023-24 season) 09/01/2023           Patient given educational materials- see patient instructions.  Discussed use, benefit, and side effects of prescribedmedications.  All patient questions answered.  Pt voiced understanding. Reviewedhealth maintenance.  Instructed to continue current medications, diet and exercise.Patient agreed with treatment plan. Follow up as directed.     Electronically signedby KENDRICK HODGE MD on 5/16/2024

## 2024-05-17 ENCOUNTER — HOSPITAL ENCOUNTER (OUTPATIENT)
Age: 49
Discharge: HOME | End: 2024-05-17
Payer: COMMERCIAL

## 2024-05-17 ENCOUNTER — HOSPITAL ENCOUNTER (OUTPATIENT)
Dept: GENERAL RADIOLOGY | Age: 49
End: 2024-05-17
Payer: COMMERCIAL

## 2024-05-17 DIAGNOSIS — M25.512 ARTHRALGIA OF LEFT ACROMIOCLAVICULAR JOINT: ICD-10-CM

## 2024-05-17 PROCEDURE — 73030 X-RAY EXAM OF SHOULDER: CPT

## 2024-05-17 PROCEDURE — 73000 X-RAY EXAM OF COLLAR BONE: CPT

## 2024-08-14 ENCOUNTER — NURSE TRIAGE (OUTPATIENT)
Dept: OTHER | Facility: CLINIC | Age: 49
End: 2024-08-14

## 2024-08-14 ENCOUNTER — HOSPITAL ENCOUNTER (EMERGENCY)
Age: 49
Discharge: HOME | End: 2024-08-14
Attending: STUDENT IN AN ORGANIZED HEALTH CARE EDUCATION/TRAINING PROGRAM
Payer: COMMERCIAL

## 2024-08-14 VITALS
OXYGEN SATURATION: 98 % | SYSTOLIC BLOOD PRESSURE: 134 MMHG | RESPIRATION RATE: 16 BRPM | DIASTOLIC BLOOD PRESSURE: 62 MMHG | HEART RATE: 77 BPM | TEMPERATURE: 98.6 F | BODY MASS INDEX: 23.82 KG/M2 | HEIGHT: 65 IN | WEIGHT: 143 LBS

## 2024-08-14 DIAGNOSIS — S61.216A LACERATION OF RIGHT LITTLE FINGER WITHOUT FOREIGN BODY WITHOUT DAMAGE TO NAIL, INITIAL ENCOUNTER: Primary | ICD-10-CM

## 2024-08-14 PROCEDURE — 99282 EMERGENCY DEPT VISIT SF MDM: CPT

## 2024-08-14 PROCEDURE — 2500000003 HC RX 250 WO HCPCS: Performed by: NURSE PRACTITIONER

## 2024-08-14 PROCEDURE — 12001 RPR S/N/AX/GEN/TRNK 2.5CM/<: CPT

## 2024-08-14 RX ORDER — LIDOCAINE HYDROCHLORIDE 10 MG/ML
5 INJECTION, SOLUTION INFILTRATION; PERINEURAL ONCE
Status: COMPLETED | OUTPATIENT
Start: 2024-08-14 | End: 2024-08-14

## 2024-08-14 RX ADMIN — LIDOCAINE HYDROCHLORIDE 5 ML: 10 INJECTION, SOLUTION INFILTRATION; PERINEURAL at 22:54

## 2024-08-14 ASSESSMENT — ENCOUNTER SYMPTOMS
SHORTNESS OF BREATH: 0
COLOR CHANGE: 0

## 2024-08-14 ASSESSMENT — PAIN - FUNCTIONAL ASSESSMENT: PAIN_FUNCTIONAL_ASSESSMENT: NONE - DENIES PAIN

## 2024-08-15 ENCOUNTER — HOSPITAL ENCOUNTER (OUTPATIENT)
Dept: WOMENS IMAGING | Age: 49
Discharge: HOME | End: 2024-08-17
Payer: COMMERCIAL

## 2024-08-15 DIAGNOSIS — Z12.31 SCREENING MAMMOGRAM FOR HIGH-RISK PATIENT: ICD-10-CM

## 2024-08-15 PROCEDURE — 77063 BREAST TOMOSYNTHESIS BI: CPT

## 2024-08-15 NOTE — DISCHARGE INSTRUCTIONS
Keep the wound clean and dry. Do not soak the wound. Gentle cleansing with soap and water is okay. Watch for signs of infection: redness, purulent drainage, increased warmth, swelling. The stitches will need to be removed in approximately 7-10 days.

## 2024-08-15 NOTE — TELEPHONE ENCOUNTER
Location of patient: [place]    Subjective: Caller states \"Sliced finger open at the knuckle\"     Current Symptoms:   Bleeding actively. Pinky finger was cut while doing dishes. Appears to be greater than 1/2 inch in size. Does not think bleeding has stopped. She currently has it covered and is holding pressure    Onset: 15 minutes ago.      What has been tried: holding pressure        Recommended disposition: Go to ED Now    Care advice provided, patient verbalizes understanding; denies any other questions or concerns; instructed to call back for any new or worsening symptoms.    Patient/caller agrees to proceed to nearest Emergency Department    Attention Provider:  Thank you for allowing me to participate in the care of your patient.  The patient was connected to triage in response to symptoms provided.   Please do not respond through this encounter as the response is not directed to a shared pool.      Reason for Disposition   [1] Bleeding AND [2] won't stop after 10 minutes of direct pressure (using correct technique)    Protocols used: Cuts and Lacerations-ADULT-

## 2024-08-15 NOTE — ED PROVIDER NOTES
worsening symptoms.        PROCEDURES:  Lac Repair    Date/Time: 8/14/2024 10:51 PM    Performed by: Consuelo Maciel APRN - CNP  Authorized by: Jayson Madera DO    Consent:     Consent obtained:  Verbal    Consent given by:  Patient    Risks, benefits, and alternatives were discussed: yes      Risks discussed:  Infection, need for additional repair, nerve damage, poor wound healing, poor cosmetic result, pain, retained foreign body, tendon damage and vascular damage    Alternatives discussed:  No treatment  Universal protocol:     Procedure explained and questions answered to patient or proxy's satisfaction: yes      Site/side marked: yes      Immediately prior to procedure, a time out was called: yes      Patient identity confirmed:  Arm band and verbally with patient  Anesthesia:     Anesthesia method:  Nerve block    Block needle gauge:  27 G    Block anesthetic:  Lidocaine 1% w/o epi    Block injection procedure:  Anatomic landmarks identified, anatomic landmarks palpated, negative aspiration for blood, incremental injection and introduced needle    Block outcome:  Anesthesia achieved  Laceration details:     Location:  Finger    Finger location:  R small finger    Length (cm):  1  Pre-procedure details:     Preparation:  Patient was prepped and draped in usual sterile fashion  Exploration:     Wound exploration: wound explored through full range of motion    Treatment:     Area cleansed with:  Saline    Amount of cleaning:  Standard    Irrigation solution:  Sterile saline  Skin repair:     Repair method:  Sutures    Suture size:  5-0    Suture material:  Prolene    Suture technique:  Simple interrupted    Number of sutures:  2  Approximation:     Approximation:  Close  Repair type:     Repair type:  Simple  Post-procedure details:     Procedure completion:  Tolerated well, no immediate complications       FINAL IMPRESSION      1. Laceration of right little finger without foreign body without damage to

## 2024-08-19 ENCOUNTER — TELEPHONE (OUTPATIENT)
Dept: OBGYN CLINIC | Age: 49
End: 2024-08-19

## 2024-08-19 DIAGNOSIS — Z91.89 AT HIGH RISK FOR BREAST CANCER: Primary | ICD-10-CM

## 2024-08-19 DIAGNOSIS — R92.333 HETEROGENEOUSLY DENSE TISSUE OF BOTH BREASTS ON MAMMOGRAPHY: ICD-10-CM

## 2024-08-19 DIAGNOSIS — Z80.3 FAMILY HISTORY OF BREAST CANCER IN SISTER: ICD-10-CM

## 2024-08-19 DIAGNOSIS — Z80.3 FAMILY HISTORY OF BREAST CANCER IN MOTHER: ICD-10-CM

## 2024-08-19 NOTE — TELEPHONE ENCOUNTER
----- Message from SEB Love CNP sent at 8/18/2024  5:07 PM EDT -----  Please notify pt that her mammogram was negative for evidence of malignancy, but she is considered high risk for breast cancer.   The American Cancer society considers  women with a 20 percent or greater lifetime risk for developing breast cancer  as \"high risk\" . Her risk was 20.21 Women at risk for breast cancer may benefit from additional  screening, which may include an annual screening mammogram alternating every  six (6) months with a breast MRI,  and referral genetic counselor,   If this is something pt would like to consider we can place referral to high risk breast cancer.

## 2024-08-19 NOTE — TELEPHONE ENCOUNTER
Pt was made aware of her results and recommendations    At this time pt declines meeting with genetics but will proceed with MRI    Mri ordered for 6 months

## 2024-09-13 LAB
CHOLEST SERPL-MCNC: 154 MG/DL
CHOLESTEROL/HDL RATIO: 2.3
GLUCOSE SERPL-MCNC: 95 MG/DL (ref 70–99)
HDLC SERPL-MCNC: 67 MG/DL
LDLC SERPL CALC-MCNC: 78 MG/DL (ref 0–130)
PATIENT FASTING?: YES
TRIGL SERPL-MCNC: 45 MG/DL

## 2024-11-26 ENCOUNTER — HOSPITAL ENCOUNTER (OUTPATIENT)
Age: 49
Setting detail: SPECIMEN
Discharge: HOME | End: 2024-11-26

## 2024-11-26 ENCOUNTER — OFFICE VISIT (OUTPATIENT)
Dept: OBGYN CLINIC | Age: 49
End: 2024-11-26
Payer: COMMERCIAL

## 2024-11-26 VITALS — WEIGHT: 151 LBS | SYSTOLIC BLOOD PRESSURE: 118 MMHG | DIASTOLIC BLOOD PRESSURE: 78 MMHG | BODY MASS INDEX: 25.13 KG/M2

## 2024-11-26 DIAGNOSIS — Z01.419 WELL WOMAN EXAM WITH ROUTINE GYNECOLOGICAL EXAM: Primary | ICD-10-CM

## 2024-11-26 PROCEDURE — 99396 PREV VISIT EST AGE 40-64: CPT | Performed by: NURSE PRACTITIONER

## 2024-11-26 PROCEDURE — G8484 FLU IMMUNIZE NO ADMIN: HCPCS | Performed by: NURSE PRACTITIONER

## 2024-11-26 SDOH — ECONOMIC STABILITY: FOOD INSECURITY: WITHIN THE PAST 12 MONTHS, YOU WORRIED THAT YOUR FOOD WOULD RUN OUT BEFORE YOU GOT MONEY TO BUY MORE.: PATIENT DECLINED

## 2024-11-26 SDOH — ECONOMIC STABILITY: INCOME INSECURITY: HOW HARD IS IT FOR YOU TO PAY FOR THE VERY BASICS LIKE FOOD, HOUSING, MEDICAL CARE, AND HEATING?: PATIENT DECLINED

## 2024-11-26 SDOH — ECONOMIC STABILITY: FOOD INSECURITY: WITHIN THE PAST 12 MONTHS, THE FOOD YOU BOUGHT JUST DIDN'T LAST AND YOU DIDN'T HAVE MONEY TO GET MORE.: PATIENT DECLINED

## 2024-11-26 ASSESSMENT — ENCOUNTER SYMPTOMS
NAUSEA: 0
COUGH: 0
COLOR CHANGE: 0
SHORTNESS OF BREATH: 0
VOMITING: 0

## 2024-11-26 NOTE — PROGRESS NOTES
cervical adenopathy.   Skin:     General: Skin is warm and dry.      Findings: No rash.   Neurological:      Mental Status: She is alert and oriented to person, place, and time.      Cranial Nerves: No cranial nerve deficit.      Deep Tendon Reflexes: Reflexes are normal and symmetric.   Psychiatric:         Behavior: Behavior normal.         Thought Content: Thought content normal.         Judgment: Judgment normal.       /78 (Site: Left Upper Arm, Position: Standing, Cuff Size: Medium Adult)   Wt 68.5 kg (151 lb)   Breastfeeding No   BMI 25.13 kg/m²     Assessment:   Assessment & Plan    Diagnosis Orders   1. Well woman exam with routine gynecological exam  PAP Smear          Breast exam completed.   Pelvic exam pap smear collected and sent. Cultures sent No    Plan:   Collect pap  ASCCP guidelines reviewed- pt accepts PAP smear yes  BSE reviewed.  Mammogram ordered:  high risk breast cancer recommend mammogram and MRI annually 6 months apart and genetic counseling if not already completed     Cultures declined     Call with any unusual bleeding, pain, discharge.    Diet & Exercise reviewed with pt.    Recommend Calcium with Vitamin D   Recommend colon cancer screening initiated between ages 45-50-UTD  Preventive  Health through PCP   RV prn/annual           Orders Placed This Encounter   Procedures    PAP Smear     Patient History:    No LMP recorded. Patient has had an ablation.  OBGYN Status: Ablation  Past Surgical History:  3/2011: COLONOSCOPY      Comment:  [facility]   2/15/2024: COLONOSCOPY; N/A      Comment:  COLORECTAL CANCER SCREENING, NOT HIGH RISK performed by                Malik Borges MD at [facility] OR  2001, 9/2007, 2/2007: DILATION AND CURETTAGE OF UTERUS  No date: ENDOMETRIAL ABLATION  2/28/2007: LAPAROSCOPY      Comment:  dx. lap for pelvic mass   No date: OTHER SURGICAL HISTORY      Comment:  removal of splenic cyst  No date: TUBAL LIGATION  No date: WISDOM TOOTH

## 2024-12-11 LAB — CYTOLOGY REPORT: NORMAL

## 2025-02-26 ENCOUNTER — HOSPITAL ENCOUNTER (OUTPATIENT)
Dept: MRI IMAGING | Age: 50
Discharge: HOME | End: 2025-02-28
Payer: COMMERCIAL

## 2025-02-26 DIAGNOSIS — R92.333 HETEROGENEOUSLY DENSE TISSUE OF BOTH BREASTS ON MAMMOGRAPHY: ICD-10-CM

## 2025-02-26 DIAGNOSIS — Z91.89 AT HIGH RISK FOR BREAST CANCER: ICD-10-CM

## 2025-02-26 DIAGNOSIS — Z80.3 FAMILY HISTORY OF BREAST CANCER IN MOTHER: ICD-10-CM

## 2025-02-26 DIAGNOSIS — Z80.3 FAMILY HISTORY OF BREAST CANCER IN SISTER: ICD-10-CM

## 2025-02-26 PROCEDURE — 6360000004 HC RX CONTRAST MEDICATION: Performed by: NURSE PRACTITIONER

## 2025-02-26 PROCEDURE — 2580000003 HC RX 258

## 2025-02-26 PROCEDURE — C8908 MRI W/O FOL W/CONT, BREAST,: HCPCS

## 2025-02-26 PROCEDURE — 2500000003 HC RX 250 WO HCPCS

## 2025-02-26 PROCEDURE — A9579 GAD-BASE MR CONTRAST NOS,1ML: HCPCS | Performed by: NURSE PRACTITIONER

## 2025-02-26 RX ORDER — 0.9 % SODIUM CHLORIDE 0.9 %
30 INTRAVENOUS SOLUTION INTRAVENOUS ONCE
Status: COMPLETED | OUTPATIENT
Start: 2025-02-26 | End: 2025-02-26

## 2025-02-26 RX ORDER — SODIUM CHLORIDE 0.9 % (FLUSH) 0.9 %
10 SYRINGE (ML) INJECTION PRN
Status: DISCONTINUED | OUTPATIENT
Start: 2025-02-26 | End: 2025-03-01 | Stop reason: HOSPADM

## 2025-02-26 RX ADMIN — GADOTERIDOL 14 ML: 279.3 INJECTION, SOLUTION INTRAVENOUS at 08:25

## 2025-02-26 RX ADMIN — SODIUM CHLORIDE, PRESERVATIVE FREE 10 ML: 5 INJECTION INTRAVENOUS at 08:25

## 2025-02-26 RX ADMIN — SODIUM CHLORIDE 30 ML: 9 INJECTION, SOLUTION INTRAVENOUS at 08:25

## 2025-03-04 DIAGNOSIS — Z12.31 VISIT FOR SCREENING MAMMOGRAM: Primary | ICD-10-CM

## (undated) DEVICE — STAZ ENDO KIT: Brand: MEDLINE INDUSTRIES, INC.

## (undated) DEVICE — GLOVE SURG 7 11.7IN BEAD CUF LIGHT BRN SENSICARE LTX FREE